# Patient Record
Sex: MALE | Race: BLACK OR AFRICAN AMERICAN
[De-identification: names, ages, dates, MRNs, and addresses within clinical notes are randomized per-mention and may not be internally consistent; named-entity substitution may affect disease eponyms.]

---

## 2018-01-18 ENCOUNTER — APPOINTMENT (OUTPATIENT)
Dept: GERIATRICS | Facility: CLINIC | Age: 73
End: 2018-01-18

## 2018-01-29 ENCOUNTER — APPOINTMENT (OUTPATIENT)
Dept: INTERNAL MEDICINE | Facility: CLINIC | Age: 73
End: 2018-01-29

## 2020-12-03 ENCOUNTER — OUTPATIENT (OUTPATIENT)
Dept: OUTPATIENT SERVICES | Facility: HOSPITAL | Age: 75
LOS: 1 days | Discharge: HOME | End: 2020-12-03

## 2020-12-03 ENCOUNTER — APPOINTMENT (OUTPATIENT)
Dept: GERIATRICS | Facility: CLINIC | Age: 75
End: 2020-12-03
Payer: MEDICAID

## 2020-12-03 VITALS
SYSTOLIC BLOOD PRESSURE: 129 MMHG | TEMPERATURE: 97.2 F | HEART RATE: 81 BPM | DIASTOLIC BLOOD PRESSURE: 81 MMHG | WEIGHT: 154 LBS | BODY MASS INDEX: 23.89 KG/M2 | HEIGHT: 67.5 IN

## 2020-12-03 DIAGNOSIS — Z86.59 PERSONAL HISTORY OF OTHER MENTAL AND BEHAVIORAL DISORDERS: ICD-10-CM

## 2020-12-03 DIAGNOSIS — Z71.6 TOBACCO ABUSE COUNSELING: ICD-10-CM

## 2020-12-03 DIAGNOSIS — F17.200 NICOTINE DEPENDENCE, UNSPECIFIED, UNCOMPLICATED: ICD-10-CM

## 2020-12-03 PROCEDURE — 99203 OFFICE O/P NEW LOW 30 MIN: CPT | Mod: GC

## 2020-12-07 PROBLEM — Z71.6 ENCOUNTER FOR TOBACCO USE CESSATION COUNSELING: Status: ACTIVE | Noted: 2020-12-07

## 2020-12-07 NOTE — PHYSICAL EXAM
[General Appearance - In No Acute Distress] : in no acute distress [Sclera] : the sclera and conjunctiva were normal [PERRL With Normal Accommodation] : pupils were equal in size, round, and reactive to light [Extraocular Movements] : extraocular movements were intact [Neck Appearance] : the appearance of the neck was normal [Neck Cervical Mass (___cm)] : no neck mass was observed [Jugular Venous Distention Increased] : there was no jugular-venous distention [Thyroid Diffuse Enlargement] : the thyroid was not enlarged [Thyroid Nodule] : there were no palpable thyroid nodules [Auscultation Breath Sounds / Voice Sounds] : lungs were clear to auscultation bilaterally [Heart Rate And Rhythm] : heart rate was normal and rhythm regular [Heart Sounds] : normal S1 and S2 [Heart Sounds Gallop] : no gallops [Murmurs] : no murmurs [Heart Sounds Pericardial Friction Rub] : no pericardial rub [Bowel Sounds] : normal bowel sounds [Abdomen Soft] : soft [Abdomen Tenderness] : non-tender [] : no hepato-splenomegaly [Abdomen Mass (___ Cm)] : no abdominal mass palpated [No Focal Deficits] : no focal deficits [FreeTextEntry1] : fred CCO3

## 2020-12-07 NOTE — HISTORY OF PRESENT ILLNESS
[FreeTextEntry1] : 75 yo male with schizophrenia, htn presented for a new visit for health maintenance\par The pt is accompanied by a nurse from [facility].\par He is well oriented and he described himself as " being taunted by demons since year 1965" but now he is no more having this problem. \par The patient was restless during the encounter but he was fully oriented and able to answer the questions. \par He has no chest pain , abdominal pain , or SOB . NO constipation , diarrhea or dysuria. He has a tremor since his childhood. Has no tardive dyskinesia or stiffness , but was restless during the encounter. His action might  not be contributed to akathisia but more of anxiety as he wanted to leave the clinic many times. He has been gaining wait recently.\par The patient has no suicidal thoughts or any thoughts to hurt anyone.\par

## 2020-12-07 NOTE — ASSESSMENT
[FreeTextEntry1] : 76 yo male presented for health maintenance visit\par \par #schizophrenia \par he is on Haldol, high doses, and olanzapine, compliant with medications  \par need psychiatry charts\par no side effects from the antipsychotic medications \par EKG done and will be requested from the [place], patient refuses to have one today\par \par #htn\par controlled on enalapril\par \par # dld\par on statins \par \par he was counseled about smoking cessation \par colonoscopy offered but patient refused \par will obtain EKG and blood work done from the [place] [facility]\par \par \par \par  [Medication Management] : medication management

## 2021-02-26 ENCOUNTER — EMERGENCY (EMERGENCY)
Facility: HOSPITAL | Age: 76
LOS: 0 days | Discharge: HOME | End: 2021-02-26
Admitting: EMERGENCY MEDICINE
Payer: COMMERCIAL

## 2021-02-26 VITALS
RESPIRATION RATE: 18 BRPM | SYSTOLIC BLOOD PRESSURE: 189 MMHG | OXYGEN SATURATION: 95 % | HEART RATE: 72 BPM | DIASTOLIC BLOOD PRESSURE: 115 MMHG | TEMPERATURE: 98 F

## 2021-02-26 DIAGNOSIS — R53.1 WEAKNESS: ICD-10-CM

## 2021-02-26 PROCEDURE — L9991: CPT

## 2021-02-26 NOTE — ED ADULT TRIAGE NOTE - CHIEF COMPLAINT QUOTE
Pt smoked a random cigarette off the floor and now is unable to stand. Pt states "maybe it was K2."
68

## 2021-04-01 ENCOUNTER — APPOINTMENT (OUTPATIENT)
Dept: GERIATRICS | Facility: CLINIC | Age: 76
End: 2021-04-01
Payer: MEDICAID

## 2021-04-01 ENCOUNTER — OUTPATIENT (OUTPATIENT)
Dept: OUTPATIENT SERVICES | Facility: HOSPITAL | Age: 76
LOS: 1 days | Discharge: HOME | End: 2021-04-01

## 2021-04-01 DIAGNOSIS — F20.9 SCHIZOPHRENIA, UNSPECIFIED: ICD-10-CM

## 2021-04-01 DIAGNOSIS — Z00.00 ENCOUNTER FOR GENERAL ADULT MEDICAL EXAMINATION W/OUT ABNORMAL FINDINGS: ICD-10-CM

## 2021-04-01 DIAGNOSIS — I10 ESSENTIAL (PRIMARY) HYPERTENSION: ICD-10-CM

## 2021-04-01 PROCEDURE — ZZZZZ: CPT

## 2021-04-01 RX ORDER — BUSPIRONE HYDROCHLORIDE 15 MG/1
15 TABLET ORAL TWICE DAILY
Qty: 60 | Refills: 5 | Status: ACTIVE | COMMUNITY
Start: 2020-12-03 | End: 1900-01-01

## 2021-04-01 RX ORDER — HALOPERIDOL 5 MG/1
5 TABLET ORAL
Qty: 90 | Refills: 5 | Status: ACTIVE | COMMUNITY
Start: 2020-12-03 | End: 1900-01-01

## 2021-04-01 RX ORDER — OLANZAPINE 20 MG/1
20 TABLET, FILM COATED ORAL
Qty: 30 | Refills: 5 | Status: ACTIVE | COMMUNITY
Start: 2020-12-03 | End: 1900-01-01

## 2021-04-01 RX ORDER — BENZTROPINE MESYLATE 2 MG/1
2 TABLET ORAL
Qty: 60 | Refills: 5 | Status: ACTIVE | COMMUNITY
Start: 2020-12-03 | End: 1900-01-01

## 2021-04-01 RX ORDER — LAMOTRIGINE 25 MG/1
25 TABLET ORAL DAILY
Qty: 60 | Refills: 5 | Status: ACTIVE | COMMUNITY
Start: 2020-12-03 | End: 1900-01-01

## 2021-04-01 RX ORDER — LATANOPROST/PF 0.005 %
0.01 DROPS OPHTHALMIC (EYE)
Qty: 3 | Refills: 1 | Status: ACTIVE | COMMUNITY
Start: 2020-12-03 | End: 1900-01-01

## 2021-04-01 RX ORDER — SIMVASTATIN 20 MG/1
20 TABLET, FILM COATED ORAL
Qty: 30 | Refills: 5 | Status: ACTIVE | COMMUNITY
Start: 2020-12-03 | End: 1900-01-01

## 2021-04-01 RX ORDER — HALOPERIDOL 10 MG/1
10 TABLET ORAL
Qty: 30 | Refills: 5 | Status: ACTIVE | COMMUNITY
Start: 2020-12-03 | End: 1900-01-01

## 2021-04-01 NOTE — HISTORY OF PRESENT ILLNESS
[Home] : at home, [unfilled] , at the time of the visit. [Medical Office: ([facility])___] : at the medical office located in  [Care Coordinator] : care coordinator [Verbal consent obtained from patient] : the patient, [unfilled] [FreeTextEntry1] : follow up visit [de-identified] : Spoke with the Nurse Marta from [facility] who gave the patient update.\par also spoke with the patient briefly.\par \par 74 yo male with PMH of schizophrenia, HTN, DLD, lives at [facility].\par he had telephonic appointment today.\par no complains\par compliant with the medication\par no medication side effects\par occasionally talks to himself\par \par \par \par

## 2021-04-01 NOTE — ASSESSMENT
[FreeTextEntry1] : 76 yo male had appointment for telephonic visit\par \par #schizophrenia \par he is on Haldol, high doses, and olanzapine, compliant with medications \par occasional hallucination, no negative symptoms\par need psychiatry charts\par no side effects from the antipsychotic medications \par psych meds refilled by the psychiatrist\par EKG script sent\par \par #htn\par controlled on enalapril\par \par # dld\par on statins \par \par HCM:\par colonoscopy request sent\par routine labs ordered\par f/u in 3 months\par \par

## 2021-04-01 NOTE — END OF VISIT
[] : Resident [FreeTextEntry3] : SEEN WITH CHARLY TEAM; was to be seen at clinic but couldn't get patient up and out today from psych unit; discussions held with patient and nurse on his unit; he is doing ell; meds were renewed and labs being ordered as well as screening colonoscopy.; RTC 3 months.

## 2021-04-02 DIAGNOSIS — F20.9 SCHIZOPHRENIA, UNSPECIFIED: ICD-10-CM

## 2021-04-02 DIAGNOSIS — I10 ESSENTIAL (PRIMARY) HYPERTENSION: ICD-10-CM

## 2021-04-02 DIAGNOSIS — Z00.00 ENCOUNTER FOR GENERAL ADULT MEDICAL EXAMINATION WITHOUT ABNORMAL FINDINGS: ICD-10-CM

## 2021-04-09 ENCOUNTER — NON-APPOINTMENT (OUTPATIENT)
Age: 76
End: 2021-04-09

## 2021-06-24 RX ORDER — CARBOXYMETHYLCELLULOSE SODIUM 5 MG/ML
0.5 SOLUTION/ DROPS OPHTHALMIC EVERY 4 HOURS
Qty: 2 | Refills: 3 | Status: ACTIVE | COMMUNITY
Start: 2020-12-03 | End: 1900-01-01

## 2021-06-24 RX ORDER — ENALAPRIL MALEATE 2.5 MG/1
2.5 TABLET ORAL
Qty: 90 | Refills: 3 | Status: ACTIVE | COMMUNITY
Start: 2020-12-03 | End: 1900-01-01

## 2021-09-23 ENCOUNTER — APPOINTMENT (OUTPATIENT)
Dept: GERIATRICS | Facility: CLINIC | Age: 76
End: 2021-09-23

## 2021-10-25 ENCOUNTER — INPATIENT (INPATIENT)
Facility: HOSPITAL | Age: 76
LOS: 2 days | Discharge: PSYCHIATRIC OR SUBSTANCE USE FACILITY | End: 2021-10-28
Attending: INTERNAL MEDICINE | Admitting: INTERNAL MEDICINE
Payer: MEDICAID

## 2021-10-25 VITALS
DIASTOLIC BLOOD PRESSURE: 77 MMHG | RESPIRATION RATE: 18 BRPM | TEMPERATURE: 98 F | OXYGEN SATURATION: 99 % | HEART RATE: 76 BPM | SYSTOLIC BLOOD PRESSURE: 140 MMHG

## 2021-10-25 DIAGNOSIS — Z53.20 PROCEDURE AND TREATMENT NOT CARRIED OUT BECAUSE OF PATIENT'S DECISION FOR UNSPECIFIED REASONS: ICD-10-CM

## 2021-10-25 DIAGNOSIS — R26.9 UNSPECIFIED ABNORMALITIES OF GAIT AND MOBILITY: ICD-10-CM

## 2021-10-25 DIAGNOSIS — F20.9 SCHIZOPHRENIA, UNSPECIFIED: ICD-10-CM

## 2021-10-25 DIAGNOSIS — R25.1 TREMOR, UNSPECIFIED: ICD-10-CM

## 2021-10-25 DIAGNOSIS — F17.210 NICOTINE DEPENDENCE, CIGARETTES, UNCOMPLICATED: ICD-10-CM

## 2021-10-25 DIAGNOSIS — R53.1 WEAKNESS: ICD-10-CM

## 2021-10-25 DIAGNOSIS — R29.898 OTHER SYMPTOMS AND SIGNS INVOLVING THE MUSCULOSKELETAL SYSTEM: ICD-10-CM

## 2021-10-25 DIAGNOSIS — N18.9 CHRONIC KIDNEY DISEASE, UNSPECIFIED: ICD-10-CM

## 2021-10-25 DIAGNOSIS — I10 ESSENTIAL (PRIMARY) HYPERTENSION: ICD-10-CM

## 2021-10-25 DIAGNOSIS — E78.5 HYPERLIPIDEMIA, UNSPECIFIED: ICD-10-CM

## 2021-10-25 DIAGNOSIS — I12.9 HYPERTENSIVE CHRONIC KIDNEY DISEASE WITH STAGE 1 THROUGH STAGE 4 CHRONIC KIDNEY DISEASE, OR UNSPECIFIED CHRONIC KIDNEY DISEASE: ICD-10-CM

## 2021-10-25 DIAGNOSIS — H40.9 UNSPECIFIED GLAUCOMA: ICD-10-CM

## 2021-10-25 DIAGNOSIS — Z91.19 PATIENT'S NONCOMPLIANCE WITH OTHER MEDICAL TREATMENT AND REGIMEN: ICD-10-CM

## 2021-10-25 LAB
ALBUMIN SERPL ELPH-MCNC: 4.1 G/DL — SIGNIFICANT CHANGE UP (ref 3.5–5.2)
ALP SERPL-CCNC: 87 U/L — SIGNIFICANT CHANGE UP (ref 30–115)
ALT FLD-CCNC: 18 U/L — SIGNIFICANT CHANGE UP (ref 0–41)
ANION GAP SERPL CALC-SCNC: 12 MMOL/L — SIGNIFICANT CHANGE UP (ref 7–14)
AST SERPL-CCNC: 16 U/L — SIGNIFICANT CHANGE UP (ref 0–41)
BASOPHILS # BLD AUTO: 0.01 K/UL — SIGNIFICANT CHANGE UP (ref 0–0.2)
BASOPHILS NFR BLD AUTO: 0.2 % — SIGNIFICANT CHANGE UP (ref 0–1)
BILIRUB SERPL-MCNC: 0.3 MG/DL — SIGNIFICANT CHANGE UP (ref 0.2–1.2)
BUN SERPL-MCNC: 18 MG/DL — SIGNIFICANT CHANGE UP (ref 10–20)
CALCIUM SERPL-MCNC: 9.2 MG/DL — SIGNIFICANT CHANGE UP (ref 8.5–10.1)
CHLORIDE SERPL-SCNC: 106 MMOL/L — SIGNIFICANT CHANGE UP (ref 98–110)
CO2 SERPL-SCNC: 22 MMOL/L — SIGNIFICANT CHANGE UP (ref 17–32)
CREAT SERPL-MCNC: 1.6 MG/DL — HIGH (ref 0.7–1.5)
EOSINOPHIL # BLD AUTO: 0.02 K/UL — SIGNIFICANT CHANGE UP (ref 0–0.7)
EOSINOPHIL NFR BLD AUTO: 0.4 % — SIGNIFICANT CHANGE UP (ref 0–8)
GLUCOSE SERPL-MCNC: 107 MG/DL — HIGH (ref 70–99)
HCT VFR BLD CALC: 38.3 % — LOW (ref 42–52)
HGB BLD-MCNC: 13.7 G/DL — LOW (ref 14–18)
IMM GRANULOCYTES NFR BLD AUTO: 0.2 % — SIGNIFICANT CHANGE UP (ref 0.1–0.3)
LYMPHOCYTES # BLD AUTO: 1.26 K/UL — SIGNIFICANT CHANGE UP (ref 1.2–3.4)
LYMPHOCYTES # BLD AUTO: 22.7 % — SIGNIFICANT CHANGE UP (ref 20.5–51.1)
MAGNESIUM SERPL-MCNC: 2 MG/DL — SIGNIFICANT CHANGE UP (ref 1.8–2.4)
MCHC RBC-ENTMCNC: 30.9 PG — SIGNIFICANT CHANGE UP (ref 27–31)
MCHC RBC-ENTMCNC: 35.8 G/DL — SIGNIFICANT CHANGE UP (ref 32–37)
MCV RBC AUTO: 86.3 FL — SIGNIFICANT CHANGE UP (ref 80–94)
MONOCYTES # BLD AUTO: 0.65 K/UL — HIGH (ref 0.1–0.6)
MONOCYTES NFR BLD AUTO: 11.7 % — HIGH (ref 1.7–9.3)
NEUTROPHILS # BLD AUTO: 3.6 K/UL — SIGNIFICANT CHANGE UP (ref 1.4–6.5)
NEUTROPHILS NFR BLD AUTO: 64.8 % — SIGNIFICANT CHANGE UP (ref 42.2–75.2)
NRBC # BLD: 0 /100 WBCS — SIGNIFICANT CHANGE UP (ref 0–0)
PLATELET # BLD AUTO: 97 K/UL — LOW (ref 130–400)
POTASSIUM SERPL-MCNC: 4.4 MMOL/L — SIGNIFICANT CHANGE UP (ref 3.5–5)
POTASSIUM SERPL-SCNC: 4.4 MMOL/L — SIGNIFICANT CHANGE UP (ref 3.5–5)
PROT SERPL-MCNC: 7 G/DL — SIGNIFICANT CHANGE UP (ref 6–8)
RBC # BLD: 4.44 M/UL — LOW (ref 4.7–6.1)
RBC # FLD: 13.3 % — SIGNIFICANT CHANGE UP (ref 11.5–14.5)
SARS-COV-2 RNA SPEC QL NAA+PROBE: SIGNIFICANT CHANGE UP
SODIUM SERPL-SCNC: 140 MMOL/L — SIGNIFICANT CHANGE UP (ref 135–146)
TROPONIN T SERPL-MCNC: <0.01 NG/ML — SIGNIFICANT CHANGE UP
WBC # BLD: 5.55 K/UL — SIGNIFICANT CHANGE UP (ref 4.8–10.8)
WBC # FLD AUTO: 5.55 K/UL — SIGNIFICANT CHANGE UP (ref 4.8–10.8)

## 2021-10-25 PROCEDURE — 99223 1ST HOSP IP/OBS HIGH 75: CPT

## 2021-10-25 PROCEDURE — 71045 X-RAY EXAM CHEST 1 VIEW: CPT | Mod: 26

## 2021-10-25 PROCEDURE — 70450 CT HEAD/BRAIN W/O DYE: CPT | Mod: 26,MA

## 2021-10-25 PROCEDURE — 93010 ELECTROCARDIOGRAM REPORT: CPT

## 2021-10-25 PROCEDURE — 99285 EMERGENCY DEPT VISIT HI MDM: CPT

## 2021-10-25 RX ORDER — CHLORHEXIDINE GLUCONATE 213 G/1000ML
1 SOLUTION TOPICAL
Refills: 0 | Status: DISCONTINUED | OUTPATIENT
Start: 2021-10-25 | End: 2021-10-28

## 2021-10-25 RX ORDER — SIMVASTATIN 20 MG/1
1 TABLET, FILM COATED ORAL
Qty: 0 | Refills: 0 | DISCHARGE

## 2021-10-25 RX ORDER — AMLODIPINE BESYLATE 2.5 MG/1
5 TABLET ORAL DAILY
Refills: 0 | Status: DISCONTINUED | OUTPATIENT
Start: 2021-10-26 | End: 2021-10-28

## 2021-10-25 RX ORDER — BENZTROPINE MESYLATE 1 MG
1 TABLET ORAL
Qty: 0 | Refills: 0 | DISCHARGE

## 2021-10-25 RX ORDER — BENZTROPINE MESYLATE 1 MG
2 TABLET ORAL
Refills: 0 | Status: DISCONTINUED | OUTPATIENT
Start: 2021-10-25 | End: 2021-10-28

## 2021-10-25 RX ORDER — LATANOPROST 0.05 MG/ML
1 SOLUTION/ DROPS OPHTHALMIC; TOPICAL
Qty: 0 | Refills: 0 | DISCHARGE

## 2021-10-25 RX ORDER — SIMVASTATIN 20 MG/1
20 TABLET, FILM COATED ORAL AT BEDTIME
Refills: 0 | Status: DISCONTINUED | OUTPATIENT
Start: 2021-10-25 | End: 2021-10-28

## 2021-10-25 RX ORDER — HALOPERIDOL DECANOATE 100 MG/ML
1 INJECTION INTRAMUSCULAR
Qty: 0 | Refills: 0 | DISCHARGE

## 2021-10-25 RX ORDER — LATANOPROST 0.05 MG/ML
1 SOLUTION/ DROPS OPHTHALMIC; TOPICAL AT BEDTIME
Refills: 0 | Status: DISCONTINUED | OUTPATIENT
Start: 2021-10-25 | End: 2021-10-28

## 2021-10-25 RX ORDER — ACETAMINOPHEN 500 MG
650 TABLET ORAL EVERY 6 HOURS
Refills: 0 | Status: DISCONTINUED | OUTPATIENT
Start: 2021-10-25 | End: 2021-10-28

## 2021-10-25 RX ORDER — HALOPERIDOL DECANOATE 100 MG/ML
5 INJECTION INTRAMUSCULAR
Refills: 0 | Status: DISCONTINUED | OUTPATIENT
Start: 2021-10-25 | End: 2021-10-28

## 2021-10-25 RX ORDER — ENOXAPARIN SODIUM 100 MG/ML
40 INJECTION SUBCUTANEOUS DAILY
Refills: 0 | Status: DISCONTINUED | OUTPATIENT
Start: 2021-10-25 | End: 2021-10-28

## 2021-10-25 RX ORDER — HALOPERIDOL DECANOATE 100 MG/ML
10 INJECTION INTRAMUSCULAR AT BEDTIME
Refills: 0 | Status: DISCONTINUED | OUTPATIENT
Start: 2021-10-25 | End: 2021-10-28

## 2021-10-25 NOTE — ED PROVIDER NOTE - OBJECTIVE STATEMENT
pt with pmhx HTN, HLD, schizophrenia from [facility] presents to ED c/o generalized weakness for weeks, no involving b/l LE and reports he almost fell today, no trauma. Denies fever/chill/HA/dizziness/chest pain/palpitation/sob/abd pain/n/v/d/ black stool/bloody stool/urinary sxs

## 2021-10-25 NOTE — ED PROVIDER NOTE - CLINICAL SUMMARY MEDICAL DECISION MAKING FREE TEXT BOX
76M with pmh schizophrenia on high dose haldol olanzapine, HTN, HLD who presents with weakness to b/l LE for several weeks. EKG, CXR, HCT and labs reviewed and wnl. On reassessment pt states he still feels weak; recommend neuro eval and will admit for further mgmt, PT.

## 2021-10-25 NOTE — H&P ADULT - DOES THIS PATIENT HAVE A HISTORY OF OR HAS BEEN DX WITH HEART FAILURE?
-------------------CRITICAL LAB VALUE-------------------    Lab Value: Hemoglobin 6.5  Time of notification: 5:43 AM  MD notified: CARDS [id number]  Patient status:  Temp:  [97  F (36.1  C)-99.3  F (37.4  C)] 98.3  F (36.8  C)  Pulse:  [] 73  Heart Rate:  [68-91] 71  Resp:  [18-30] 18  BP: ()/() 93/67  SpO2:  [95 %-100 %] 96 %  Orders received:   Recheck CBC with platelets    no

## 2021-10-25 NOTE — H&P ADULT - HISTORY OF PRESENT ILLNESS
77yo M with pmhx of HLD, HTN, schizophrenia  75yo M with pmhx of HLD, HTN, schizophrenia presents from [facility] for b/l leg weakness. Patient almost fell when attempting to go to the bathroom. Said his legs gave out on him and denies any dizziness/syncope. Patient is otherwise poor historian saying he does not have HLD/HTN. Patient also states he has a tremor for about 15 years for which no doctor has given him treatment for. Leg weakness is also accompanied by pain which he cannot point to a specific part of his legs.     ED course:   vitals: /77, HR 76, afebrile, O2 99% RA   labs: Cr 1.6  imaging:   - CT head: wnl

## 2021-10-25 NOTE — ED PROVIDER NOTE - NS ED ROS FT
Constitutional: no fever, chills, no recent weight loss, change in appetite or malaise  Eyes: no redness/discharge/pain/vision changes  ENT: no rhinorrhea/ear pain/sore throat  Cardiac: No chest pain, SOB or edema.  Respiratory: No cough or respiratory distress  GI: No nausea, vomiting, diarrhea or abdominal pain.  : No dysuria, frequency, urgency or hematuria  MS: no pain to back or extremities, no loss of ROM  Neuro: No headache. No LOC.  Skin: No skin rash.  Endocrine: No history of thyroid disease or diabetes.  Except as documented in the HPI, all other systems are negative.

## 2021-10-25 NOTE — ED PROVIDER NOTE - ATTENDING CONTRIBUTION TO CARE
76M with pmh schizophrenia on high dose haldol olanzapine, HTN, HLD who presents with weakness to b/l LE for several weeks. He denies ascending weakness, falls, focal weakness or numbness, headache, visual changes.     Gen - NAD, Head - NCAT, Pharynx - clear, MMM, Heart - RRR, no m/g/r, Lungs - CTAB, no w/c/r, Abdomen - soft, NT, ND, Skin - No rash, Extremities - 5/5 muscle strength b/l LE, FROM, no edema, erythema, ecchymosis, brisk cap refill, Neuro - A&O x3, equal strength and sensation, non-focal exam    a/p:  subjective weakness to b/l LE, 5/5 strengh on exam  EKG, CXR, labs incl trop, mag, HCT reassess

## 2021-10-25 NOTE — ED ADULT NURSE NOTE - OBJECTIVE STATEMENT
Pt presents to ED with c/o bilateral lower extremity weakness. Pt states he has had this issue for years, but its been exacerbated over the past few days. Pt denies trauma, N/V/D, dizziness, chest pain, SOB.

## 2021-10-25 NOTE — H&P ADULT - NSHPLABSRESULTS_GEN_ALL_CORE
13.7   5.55  )-----------( 97       ( 25 Oct 2021 17:10 )             38.3         140  |  106  |  18  ----------------------------<  107<H>  4.4   |  22  |  1.6<H>    Ca    9.2      25 Oct 2021 17:10  Mg     2.0     10-25    TPro  7.0  /  Alb  4.1  /  TBili  0.3  /  DBili  x   /  AST  16  /  ALT  18  /  AlkPhos  87  10-25        CXR: no acute cardiopul disease noted (read by me)

## 2021-10-25 NOTE — H&P ADULT - NSHPSOCIALHISTORY_GEN_ALL_CORE
Marital Status:  (   )    (   ) Single    (   )    (  )   Lives with: (  ) alone  (  ) children   (  ) spouse   (  ) parents  (  ) other  Recent Travel: No recent travel  Occupation:    Substance Use (street drugs): ( x ) never used  (  ) other:  Tobacco Usage:  ( x  ) never smoked   (   ) former smoker   (   ) current smoker  (     ) pack year  Alcohol Usage: None denies smoking tobacco, drinking alcohol, drug use

## 2021-10-25 NOTE — H&P ADULT - NSHPPHYSICALEXAM_GEN_ALL_CORE
T(C): 36.7 (10-25-21 @ 15:35), Max: 36.7 (10-25-21 @ 15:35)  HR: 76 (10-25-21 @ 15:35) (76 - 76)  BP: 140/77 (10-25-21 @ 15:35) (140/77 - 140/77)  RR: 18 (10-25-21 @ 15:35) (18 - 18)  SpO2: 99% (10-25-21 @ 15:35) (99% - 99%)        GENERAL: NAD, well-developed  HEAD:  Atraumatic, Normocephalic  EYES: EOMI, PERRLA, conjunctiva and sclera clear  ENT: Normal tympanic membrane. No nasal obstruction or discharge. No tonsillar exudate, swelling or erythema.  NECK: Supple, No JVD  CHEST/LUNG: Clear to auscultation bilaterally; No wheeze  HEART: Regular rate and rhythm; No murmurs, rubs, or gallops  ABDOMEN: Soft, Nontender, Nondistended; Bowel sounds present  EXTREMITIES:  2+ Peripheral Pulses, No clubbing, cyanosis, or edema  PSYCH: AAOx3  NEUROLOGY: non-focal  SKIN: No rashes or lesions

## 2021-10-25 NOTE — H&P ADULT - ATTENDING COMMENTS
76 yr old male with hx of HLD, HTN, schizophrenia presents from [facility] for b/l leg weakness. Patient reports for past few month he noticed weakness of both legs. Today patient was unable to ambulate due to severe weakness and legs giving out.  # B/L LE weakness   # Hx of Schizophrenia  - antipsych med side effect vs parkinson's disease vs muscular deconditioning  - check orthostatics   - check Vit B12, Folate, RPR   - psych consult for med adjustment   - neurology consult  - PT     # OLIVER vs. CKD  - Cr 1.6 (baseline unknown); BUN 18  - start NS@75  - f/u urine studies     # HTN   - hold enalapril  - start amlodipine 5mg daily    # DVT ppx  - start heparin s/q    # DASH diet    # Full code 76 yr old male with hx of HLD, HTN, schizophrenia presents from [facility] for b/l leg weakness. Patient reports for past few month he noticed weakness of both legs. Today patient was unable to ambulate due to severe weakness and legs giving out.  # B/L LE weakness   # Hx of Schizophrenia  - antipsych med side effect vs parkinson's disease vs muscular deconditioning  - check orthostatics   - check Vit B12, Folate, RPR   - psych consult for med adjustment   - neurology consult  - PT     # OLIVER vs. CKD  - Cr 1.6 (baseline unknown); BUN 18  - start NS@75  - f/u urine studies     # HTN   - hold enalapril  - start amlodipine 5mg daily    # DVT ppx    # DVT ppx  - start heparin s/q    # DASH diet    # Full code 76 yr old male with hx of HLD, HTN, schizophrenia presents from [facility] for b/l leg weakness. Patient reports for past few month he noticed weakness of both legs. Today patient was unable to ambulate due to severe weakness and legs giving out.    # B/L LE weakness   # Hx of Schizophrenia  - antipsych med side effect vs parkinson's disease vs muscular deconditioning  - check orthostatics   - check Vit B12, Folate, RPR   - psych consult for med adjustment   - neurology consult  - PT     # OLIVER vs. CKD  - Cr 1.6 (baseline unknown); BUN 18  - start NS@75  - f/u urine studies     # HTN   - hold enalapril  - start amlodipine 5mg daily    # DVT ppx    # DVT ppx  - start heparin s/q    # DASH diet    # Full code

## 2021-10-25 NOTE — H&P ADULT - ASSESSMENT
77yo M with pmhx of HLD, HTN, schizophrenia presents from [facility] for b/l leg weakness. Admitted for further work up.     #b/l leg weakness  - DDx: parkinsonism as side effect of psych meds (however patient's on benztropine)   - normal muscle strength on exam   - get vitamin B12/folate level; continue to trend lytes   - get PT, physiatry eval  - neuro consult; may need psych consult     #schizophrenia  - c/w haldol 5mg po am and 1pm, 10mg at hs   - c/w buspirinone 15mg bid and benztropine 2mg bid     #OLIVER vs. CKD  - Cr 1.6, eGFR 42; no prior records   - get urine lytes and renal ultrasound   - trend Cr daily     #HLD  - c/w simvastatin 20mg daily     #HTN   - takes enalapril 2.5mg daily; hold for now due to possible oliver   - start amlodipine 5mg daily (african american)     DVT ppx: lovenox   GI ppx: none  Diet: DASH  Code Status: full code  Dispo: from [place] [facility]; neuro consult, PT eval, urine lytes/renal u/s    77yo M with pmhx of HLD, HTN, schizophrenia presents from [facility] for b/l leg weakness. Admitted for further work up.     #b/l leg weakness  - DDx: parkinsonism as side effect of psych meds (however patient's on benztropine)   - normal muscle strength on exam   - get vitamin B12/folate level; get utox   - get PT, physiatry eval  - neuro consult; may need psych consult     #schizophrenia  - c/w haldol 5mg po am and 1pm, 10mg at hs   - c/w buspirinone 15mg bid and benztropine 2mg bid     #OLIVER vs. CKD  - Cr 1.6, eGFR 42; no prior records   - get urine lytes and renal ultrasound   - trend Cr daily     #HLD  - c/w simvastatin 20mg daily     #HTN   - takes enalapril 2.5mg daily; hold for now due to possible oliver   - start amlodipine 5mg daily (african american)     DVT ppx: lovenox   GI ppx: none  Diet: DASH  Code Status: full code  Dispo: from [place] [facility]; neuro consult, PT eval, urine lytes/renal u/s    77yo M with pmhx of HLD, HTN, schizophrenia presents from [facility] for b/l leg weakness. Admitted for further work up.     #b/l leg weakness  - DDx: parkinsonism as side effect of psych meds (however patient's on benztropine)   - normal muscle strength on exam   - get vitamin B12/folate level/RPR; get utox   - get PT, physiatry eval  - neuro consult; psych consult for possible adjustment of meds     #schizophrenia  - c/w haldol 5mg po am and 1pm, 10mg at hs   - c/w buspirinone 15mg bid and benztropine 2mg bid     #OLIVER vs. CKD  - Cr 1.6, eGFR 42; no prior records   - get urine lytes and renal ultrasound   - trend Cr daily     #HLD  - c/w simvastatin 20mg daily     #HTN   - takes enalapril 2.5mg daily; hold for now due to possible oliver   - start amlodipine 5mg daily (african american)     DVT ppx: lovenox   GI ppx: none  Diet: DASH  Code Status: full code  Dispo: from [place] [facility]; neuro consult, PT eval, urine lytes/renal u/s

## 2021-10-25 NOTE — ED PROVIDER NOTE - PHYSICAL EXAMINATION
CONSTITUTIONAL: Well-appearing; well-nourished; in no apparent distress.   CARDIOVASCULAR: Normal S1, S2; no murmurs, rubs, or gallops.   RESPIRATORY: Normal chest excursion with respiration; breath sounds clear and equal bilaterally; no wheezes, rhonchi, or rales.  MS: No evidence of trauma or deformity. Normal ROM in all four extremities; non-tender to palpation; distal pulses are normal.   SKIN: Normal for age and race; warm; dry; good turgor; no apparent lesions or exudate.   NEURO/PSYCH: A & O x 4; strength equal to b/l upper and lower extremities. speaking coherently.

## 2021-10-26 DIAGNOSIS — F20.9 SCHIZOPHRENIA, UNSPECIFIED: ICD-10-CM

## 2021-10-26 PROCEDURE — 99222 1ST HOSP IP/OBS MODERATE 55: CPT

## 2021-10-26 PROCEDURE — 99254 IP/OBS CNSLTJ NEW/EST MOD 60: CPT | Mod: GC

## 2021-10-26 PROCEDURE — 76775 US EXAM ABDO BACK WALL LIM: CPT | Mod: 26

## 2021-10-26 RX ORDER — SODIUM CHLORIDE 9 MG/ML
1000 INJECTION INTRAMUSCULAR; INTRAVENOUS; SUBCUTANEOUS
Refills: 0 | Status: DISCONTINUED | OUTPATIENT
Start: 2021-10-26 | End: 2021-10-28

## 2021-10-26 RX ADMIN — SODIUM CHLORIDE 75 MILLILITER(S): 9 INJECTION INTRAMUSCULAR; INTRAVENOUS; SUBCUTANEOUS at 22:21

## 2021-10-26 RX ADMIN — HALOPERIDOL DECANOATE 10 MILLIGRAM(S): 100 INJECTION INTRAMUSCULAR at 22:15

## 2021-10-26 RX ADMIN — Medication 2 MILLIGRAM(S): at 05:04

## 2021-10-26 RX ADMIN — ENOXAPARIN SODIUM 40 MILLIGRAM(S): 100 INJECTION SUBCUTANEOUS at 11:34

## 2021-10-26 RX ADMIN — Medication 2 MILLIGRAM(S): at 17:15

## 2021-10-26 RX ADMIN — Medication 15 MILLIGRAM(S): at 17:15

## 2021-10-26 RX ADMIN — HALOPERIDOL DECANOATE 5 MILLIGRAM(S): 100 INJECTION INTRAMUSCULAR at 05:05

## 2021-10-26 RX ADMIN — SIMVASTATIN 20 MILLIGRAM(S): 20 TABLET, FILM COATED ORAL at 22:15

## 2021-10-26 RX ADMIN — LATANOPROST 1 DROP(S): 0.05 SOLUTION/ DROPS OPHTHALMIC; TOPICAL at 22:16

## 2021-10-26 RX ADMIN — HALOPERIDOL DECANOATE 5 MILLIGRAM(S): 100 INJECTION INTRAMUSCULAR at 11:34

## 2021-10-26 RX ADMIN — Medication 15 MILLIGRAM(S): at 05:04

## 2021-10-26 RX ADMIN — AMLODIPINE BESYLATE 5 MILLIGRAM(S): 2.5 TABLET ORAL at 05:05

## 2021-10-26 RX ADMIN — CHLORHEXIDINE GLUCONATE 1 APPLICATION(S): 213 SOLUTION TOPICAL at 05:04

## 2021-10-26 NOTE — PHYSICAL THERAPY INITIAL EVALUATION ADULT - GENERAL OBSERVATIONS, REHAB EVAL
PT IE [id number]. Chart reviewed. pt encountered in bed. In NAD. + IV lock. pt appeared to be alert, confused and oriented x2-3

## 2021-10-26 NOTE — BEHAVIORAL HEALTH ASSESSMENT NOTE - NSBHCHARTREVIEWIMAGING_PSY_A_CORE FT
< from: CT Head No Cont (10.25.21 @ 17:10) >  Impression:    1. No evidence of acute intracranial hemorrhage or mass effect.

## 2021-10-26 NOTE — PROGRESS NOTE ADULT - SUBJECTIVE AND OBJECTIVE BOX
----------Daily Progress Note----------    HISTORY OF PRESENT ILLNESS:  Patient is a 76y old Male who presents with a chief complaint of leg weakness (26 Oct 2021 02:17)    Currently admitted to medicine with the primary diagnosis of Weakness    75yo M with pmhx of HLD, HTN, schizophrenia presents from [facility] for b/l leg weakness. Patient almost fell when attempting to go to the bathroom. Said his legs gave out on him and denies any dizziness/syncope. Patient is otherwise poor historian saying he does not have HLD/HTN. Patient also states he has a tremor for about 15 years for which no doctor has given him treatment for. Leg weakness is also accompanied by pain which he cannot point to a specific part of his legs.     ED course:   vitals: /77, HR 76, afebrile, O2 99% RA   labs: Cr 1.6  imaging:   - CT head: wnl        Today is hospital day 1d.     INTERVAL HOSPITAL COURSE / OVERNIGHT EVENTS:    Patient was examined and seen at bedside. This morning he is resting comfortably in bed and reports no new issues or overnight events.     Review of Systems: Otherwise unremarkable     <<<<<PAST MEDICAL & SURGICAL HISTORY>>>>>  Schizophrenia    Hypertension    Hyperlipidemia    Glaucoma    No significant past surgical history      ALLERGIES  No Known Allergies      Home Medications:  benztropine 2 mg oral tablet: 1 tab(s) orally 2 times a day (25 Oct 2021 23:48)  busPIRone 15 mg oral tablet: 1 tab(s) orally 2 times a day (25 Oct 2021 23:48)  enalapril 2.5 mg oral tablet: 1 tab(s) orally once a day (25 Oct 2021 23:48)  haloperidol 10 mg oral tablet: 1 tab(s) orally once a day (at bedtime) (25 Oct 2021 23:48)  haloperidol 5 mg oral tablet: 1 tab(s) orally in am and 1pm  (25 Oct 2021 23:48)  latanoprost 0.005% ophthalmic solution: 1 drop(s) to each affected eye once a day (in the evening) (25 Oct 2021 23:48)  simvastatin 20 mg oral tablet: 1 tab(s) orally once a day (at bedtime) (25 Oct 2021 23:48)        MEDICATIONS  STANDING MEDICATIONS  amLODIPine   Tablet 5 milliGRAM(s) Oral daily  benztropine 2 milliGRAM(s) Oral two times a day  busPIRone 15 milliGRAM(s) Oral two times a day  chlorhexidine 4% Liquid 1 Application(s) Topical <User Schedule>  enoxaparin Injectable 40 milliGRAM(s) SubCutaneous daily  haloperidol     Tablet 5 milliGRAM(s) Oral <User Schedule>  haloperidol     Tablet 10 milliGRAM(s) Oral at bedtime  latanoprost 0.005% Ophthalmic Solution 1 Drop(s) Both EYES at bedtime  simvastatin 20 milliGRAM(s) Oral at bedtime    PRN MEDICATIONS  acetaminophen     Tablet .. 650 milliGRAM(s) Oral every 6 hours PRN    VITALS:  T(F): 99.2  HR: 72  BP: 131/74  RR: 18  SpO2: 96%    <<<<<LABS>>>>>                        13.7   5.55  )-----------( 97       ( 25 Oct 2021 17:10 )             38.3     10-25    140  |  106  |  18  ----------------------------<  107<H>  4.4   |  22  |  1.6<H>    Ca    9.2      25 Oct 2021 17:10  Mg     2.0     10-25    TPro  7.0  /  Alb  4.1  /  TBili  0.3  /  DBili  x   /  AST  16  /  ALT  18  /  AlkPhos  87  10-25          Troponin T, Serum: <0.01 ng/mL (10-25-21 @ 17:10)    [id number]  CARDIAC MARKERS ( 25 Oct 2021 17:10 )  x     / <0.01 ng/mL / x     / x     / x            <<<<<RADIOLOGY>>>>>  < from: CT Head No Cont (10.25.21 @ 17:10) >    Impression:    1. No evidence of acute intracranial hemorrhage or mass effect.    --- End of Report ---    < end of copied text >      <<<<<PHYSICAL EXAM>>>>>  GENERAL: Well developed, well nourished and in no acute distress. Resting comfortably in bed.  HEENT: Normocephalic, atraumatic, mucous membranes moist, EOMI, PERRLA, bilateral sclera anicteric, no conjunctival injection  Neck: Supple, non-tender, no lymphadenopathy.  PULMONARY: Clear to auscultation bilaterally. No rales, rhonchi, or wheezing.  CARDIOVASCULAR: Regular rate and rhythm, S1-S2, no murmurs  GASTROINTESTINAL: Soft, non-tender, non-distended, no guarding.  RENAL: No CVA tenderness.  SKIN/EXTREMITIES: No clubbing or edema  NEUROLOGIC/MUSCULOSKELETAL: AOx4, grossly moving all extremities, no focal deficits.      ----------------------------------------------------------------------------------------------------------------------------------------------------------------------------------------------- ----------Daily Progress Note----------    HISTORY OF PRESENT ILLNESS:  Patient is a 76y old Male who presents with a chief complaint of leg weakness (26 Oct 2021 02:17)    Currently admitted to medicine with the primary diagnosis of Weakness    77yo M with pmhx of HLD, HTN, schizophrenia presents from [facility] for b/l leg weakness. Patient almost fell when attempting to go to the bathroom. Said his legs gave out on him and denies any dizziness/syncope. Patient is otherwise poor historian saying he does not have HLD/HTN. Patient also states he has a tremor for about 15 years for which no doctor has given him treatment for. Leg weakness is also accompanied by pain which he cannot point to a specific part of his legs.     ED course:   vitals: /77, HR 76, afebrile, O2 99% RA   labs: Cr 1.6  imaging:   - CT head: wnl        Today is hospital day 1d.     INTERVAL HOSPITAL COURSE / OVERNIGHT EVENTS:    Patient was examined and seen at bedside. This morning he is resting comfortably in bed and reports no new issues or overnight events.     Review of Systems: Patient is confused. Denies any headache, dizziness, fever, chills. Patient denies chest pain, palpitation, SOB. Patient denies weakness     <<<<<PAST MEDICAL & SURGICAL HISTORY>>>>>  Schizophrenia    Hypertension    Hyperlipidemia    Glaucoma    No significant past surgical history      ALLERGIES  No Known Allergies      Home Medications:  benztropine 2 mg oral tablet: 1 tab(s) orally 2 times a day (25 Oct 2021 23:48)  busPIRone 15 mg oral tablet: 1 tab(s) orally 2 times a day (25 Oct 2021 23:48)  enalapril 2.5 mg oral tablet: 1 tab(s) orally once a day (25 Oct 2021 23:48)  haloperidol 10 mg oral tablet: 1 tab(s) orally once a day (at bedtime) (25 Oct 2021 23:48)  haloperidol 5 mg oral tablet: 1 tab(s) orally in am and 1pm  (25 Oct 2021 23:48)  latanoprost 0.005% ophthalmic solution: 1 drop(s) to each affected eye once a day (in the evening) (25 Oct 2021 23:48)  simvastatin 20 mg oral tablet: 1 tab(s) orally once a day (at bedtime) (25 Oct 2021 23:48)        MEDICATIONS  STANDING MEDICATIONS  amLODIPine   Tablet 5 milliGRAM(s) Oral daily  benztropine 2 milliGRAM(s) Oral two times a day  busPIRone 15 milliGRAM(s) Oral two times a day  chlorhexidine 4% Liquid 1 Application(s) Topical <User Schedule>  enoxaparin Injectable 40 milliGRAM(s) SubCutaneous daily  haloperidol     Tablet 5 milliGRAM(s) Oral <User Schedule>  haloperidol     Tablet 10 milliGRAM(s) Oral at bedtime  latanoprost 0.005% Ophthalmic Solution 1 Drop(s) Both EYES at bedtime  simvastatin 20 milliGRAM(s) Oral at bedtime    PRN MEDICATIONS  acetaminophen     Tablet .. 650 milliGRAM(s) Oral every 6 hours PRN    VITALS:  T(F): 99.2  HR: 72  BP: 131/74  RR: 18  SpO2: 96%    <<<<<LABS>>>>>                        13.7   5.55  )-----------( 97       ( 25 Oct 2021 17:10 )             38.3     10-25    140  |  106  |  18  ----------------------------<  107<H>  4.4   |  22  |  1.6<H>    Ca    9.2      25 Oct 2021 17:10  Mg     2.0     10-25    TPro  7.0  /  Alb  4.1  /  TBili  0.3  /  DBili  x   /  AST  16  /  ALT  18  /  AlkPhos  87  10-25          Troponin T, Serum: <0.01 ng/mL (10-25-21 @ 17:10)    [id number]  CARDIAC MARKERS ( 25 Oct 2021 17:10 )  x     / <0.01 ng/mL / x     / x     / x            <<<<<RADIOLOGY>>>>>  < from: CT Head No Cont (10.25.21 @ 17:10) >    Impression:    1. No evidence of acute intracranial hemorrhage or mass effect.    --- End of Report ---    < end of copied text >      <<<<<PHYSICAL EXAM>>>>>  GENERAL: Well developed, well nourished and in no acute distress. Resting comfortably in bed.  PULMONARY: Clear to auscultation bilaterally. No rales, rhonchi, or wheezing.  CARDIOVASCULAR: Regular rate and rhythm, S1-S2, no murmurs  GASTROINTESTINAL: Soft, non-tender, non-distended, no guarding.  SKIN/EXTREMITIES: No clubbing or edema  NEUROLOGIC/MUSCULOSKELETAL: AOx1      -----------------------------------------------------------------------------------------------------------------------------------------------------------------------------------------------

## 2021-10-26 NOTE — CONSULT NOTE ADULT - ASSESSMENT
75yo M with pmhx of HLD, HTN, schizophrenia presents from [facility] for sudden b/l leg weakness which according to patient has been occuring intermittently since past few years.. Patient almost fell when attempting to go to the bathroom. Said his legs gave out on him and denies any dizziness/syncope. Patient also states he has a tremor for about 15 years for which no doctor has given him treatment. Leg weakness is not a/w back pain, numbness, tingling, b/b incontinence, or focal weakness. Patient has normal UE/LE strength , has brisk patellar reflex , normal sensation. CTH negative for acute intracranial findings.      Plan  MRI brain w/o contrast  Check vitamin b12, folate level, vitamin d levels , RPR  Neuro attending note will follow  
IMPRESSION: Rehab of gait dysfunction     PRECAUTIONS: [   ] Cardiac  [   ] Respiratory  [   ] Seizures [   ] Contact Isolation  [   ] Droplet Isolation  [   ] Other    Weight Bearing Status:     RECOMMENDATION:    Out of Bed to Chair     DVT/Decubiti Prophylaxis    REHAB PLAN:     [ x   ] Bedside P/T 3-5 times a week   [    ]   Bedside O/T  2-3 times a week             [    ] Speech Therapy               [    ]  No Rehab Therapy Indicated   Conditioning/ROM                                    ADL  Bed Mobility                                               Conditioning/ROM  Transfers                                                     Bed Mobility  Sitting /Standing Balance                         Transfers                                        Gait Training                                               Sitting/Standing Balance  Stair Training [   ]Applicable                    Home equipment Eval                                                                        Splinting  [   ] Only      GOALS:   ADL   [    ]   Independent                    Transfers  [ x   ] Independent                          Ambulation  [   x ] Independent     [   x  ] With device                            [    ]  CG                                                         [    ]  CG                                                                  [    ] CG                            [    ] Min A                                                   [    ] Min A                                                              [    ] Min  A          DISCHARGE PLAN:   [    ]  Good candidate for Intensive Rehabilitation/Hospital based                                             Will tolerate 3hrs Intensive Rehab Daily                                       [  x   ]  Short Term Rehab in Skilled Nursing Facility  /  psych unit                                       [     ]  Home with Outpatient or VN services                                         [     ]  Possible Candidate for Intensive Hospital based Rehab

## 2021-10-26 NOTE — BEHAVIORAL HEALTH ASSESSMENT NOTE - NSBHCONSULTFOLLOWAFTERCARE_PSY_A_CORE FT
Mr. Micehlle will return to [facility] to follow up with psychiatric care with Dr. Esparza Mr. Michelle will return to [facility] [place] [address], phone number - [phone] , with the plan to follow up with psychiatric care with his  Outpatient Psychiatrist Dr Esparza at the [facility] , [facility] , [address] [place] [address], phone number - [phone].

## 2021-10-26 NOTE — PROGRESS NOTE ADULT - ASSESSMENT
76 yr old male with hx of HLD, HTN, schizophrenia presents from [facility] for b/l leg weakness. Patient reports for past few month he noticed weakness of both legs. Today patient was unable to ambulate due to severe weakness and legs giving out.    #B/L LE weakness   #Hx of Schizophrenia  - antipsychotic med side effect vs parkinson's disease vs muscular deconditioning  - CT H: WNL   - F/u orthostatics   - f/u Vit B12, Folate, RPR   - F/u psych consult for med adjustment   - F/u neurology consult  - PT     #OLIVER vs. CKD  - Cr 1.6 (baseline unknown); BUN 18  - c/w NS@75  - f/u urine studies     #HTN   - hold Enalapril  - amlodipine 5mg QD was started instead     #HLD  - c/w Statin     #Misc   - DVT prophylaxis: Heparin sq  - GI prophylaxis not indicated   - Diet: DASH/TLC   - Activity status: AAT   - Code status: Full code   - Disposition: IPP once medically optimized   Pending:      76 yr old male with hx of HLD, HTN, schizophrenia presents from [facility] for b/l leg weakness. Patient reports for past few month he noticed weakness of both legs. Today patient was unable to ambulate due to severe weakness and legs giving out.    #B/L LE weakness   #Hx of Schizophrenia  - antipsychotic med side effect vs parkinson's disease vs muscular deconditioning  - CT H: WNL   - F/u orthostatics   - f/u Vit B12, Folate, RPR   - F/u psych consult for med adjustment   - F/u neurology consult  - PT   - Patient is refusing all labs "hurts his legs". spoke to the patient with psych resident and patient states if we draw labs his "legs will die"     #OLIVER vs. CKD  - Cr 1.6 (baseline unknown); BUN 18  - c/w NS@75  - f/u urine studies     #HTN   - hold Enalapril  - amlodipine 5mg QD was started instead     #HLD  - c/w Statin     #Misc   - DVT prophylaxis: Heparin sq  - GI prophylaxis not indicated   - Diet: DASH/TLC   - Activity status: AAT   - Code status: Full code   - Disposition: IPP once medically optimized   Pending:      76 yr old male with hx of HLD, HTN, schizophrenia presents from [facility] for b/l leg weakness. Patient reports for past few month he noticed weakness of both legs. Today patient was unable to ambulate due to severe weakness and legs giving out.    #B/L LE weakness   #Hx of Schizophrenia  - antipsychotic med side effect vs parkinson's disease vs muscular deconditioning  - CT H: WNL   - F/u orthostatics   - f/u Vit B12, Folate, RPR   - F/u psych consult for med adjustment   - F/u neurology consult  - PT   - Patient is refusing all labs "hurts his legs". spoke to the patient with psych resident and patient states if we draw labs his "legs will die"   - f/u MR Brain     #OLIVER vs. CKD  - Cr 1.6 (baseline unknown); BUN 18  - c/w NS@75  - f/u urine studies     #HTN   - hold Enalapril  - amlodipine 5mg QD was started instead     #HLD  - c/w Statin     #Misc   - DVT prophylaxis: Heparin sq  - GI prophylaxis not indicated   - Diet: DASH/TLC   - Activity status: AAT   - Code status: Full code   - Disposition: IPP once medically optimized   Pending:

## 2021-10-26 NOTE — BEHAVIORAL HEALTH ASSESSMENT NOTE - SUMMARY
Psychiatry recommends:    #Schizophrenia vs delirium   - patient does not require inpatient psychiatric admission for stabilization  - Please continue to explore medical causes for possible medical cause for symptoms of weakness  - haldol 5mg PO morning and 1pm, 10 mg PO nightly  - buspirone 15mg bid   - benztropine 2 mg PO BID Eber Michelle is a 76-year-old male, domiciled at [facility] for 13+ years; history of HTN, HLD, psychiatric history of paranoid schizophrenia, admitted due to bilateral leg weakness. Psychiatry consulted to assess if the patient would benefit from an adjustment in meds.    On evaluation, Mr. Michelle appears paranoid, but not frankly psychotic, with some delusions noted regarding the etiology of his leg weakness. This paranoia appears consistent with his chronic illness for which he resides at a transitional residence. He reports doing well with his currently medication regimen, and does not appear internally preoccupied. Additionally, he denies acute symptoms of depression or kevan, nor does he endorse suicidal intent, ideation, or plan. At this time, he does not appear to be a threat to himself or others; his psychiatric risk factors would not be mitigated by inpatient psychiatric admission.     Given his psychiatric symptoms are stable on his current medication regimen, we do not recommend modifying them during this admission, as the patient's weakness appears acute, and weakness is not typically associated with antipsychotic use (and he is not complaining of leg tremor). However, we recommend neurology consult for an assessment of patient’s bilateral leg weakness. Upon discharge, we recommend that patient follow up with his Outpatient Psychiatrist Dr Esparza at the [facility], [address] [place] [address], phone number - [phone].     Psychiatry recommends:    #Schizophrenia vs delirium   - patient does not require inpatient psychiatric admission for stabilization  - Please continue to explore medical causes for possible medical cause for symptoms of weakness  - haldol 5mg PO morning and 1pm, 10 mg PO nightly  - buspirone 15mg bid   - benztropine 2 mg PO BID

## 2021-10-26 NOTE — BEHAVIORAL HEALTH ASSESSMENT NOTE - CASE SUMMARY
Mr Michelle is a 76-year-old man with a history of Paranoid Schizophrenia who was admitted to the medical for the evaluation and management of  bilateral leg weakness. Psychiatry consult was called for medication management because of the concern by the medical team that patient has bilateral leg weakness and Parkinsonism like symptoms possibly as a result of his psychotropic medications.   It does not appear that patient’s reported leg weakness is as a result of Parkinsonism nor does it appear to be secondary to his psychotropic medication as the reported started a few days ago as reported by both the patient and the staff of his residence. Patient seems to have chronically been on antipsychotics and currently is said to be doing well on his current medications.   For now, patient appears to be paranoid however his paranoid appears to be chronic and he does not have any other symptoms suggestive of acute psychosis. Patient also does not have any symptoms suggestive of acute kevan or depression and he also denies having current suicidal ideations, intent or plan.  At this time, patient is not considered an imminent danger to himself or others and does not need inpatient psychiatric hospitalization. Patient will benefit from continuation of his psychotropic medications as prescribed by his outpatient psychiatrist. However, we recommend neurology consult for an assessment of patient’s bilateral leg weakness. Upon discharge, we recommend that patient follow up with his Outpatient Psychiatrist Dr Esparza at the [facility] , [address] [place] [address], phone number - [phone].

## 2021-10-26 NOTE — BEHAVIORAL HEALTH ASSESSMENT NOTE - NSBHCHARTREVIEWINVESTIGATE_PSY_A_CORE FT
< from: 12 Lead ECG (10.25.21 @ 16:19) >  Ventricular Rate 84 BPM  Atrial Rate 84 BPM  P-R Interval 164 ms  QRS Duration 138 ms  Q-T Interval 380 ms  QTC Calculation(Bazett) 449 ms

## 2021-10-26 NOTE — CONSULT NOTE ADULT - ATTENDING COMMENTS
Patient seen and examined and agree with above except as noted.  Patients history, notes, labs, imaging, vitals and meds reviewed personally.    Patient states he has had difficulty ambulating for about 2 years and has had multiple falls.  He has also had tremors since he was 17 years old.  His exam shows full strength in the LE and tremors in the R>L UE and some dyskinesias in the tongue and mouth.  No dysmetria and significant neuropathy    Plan as above (If workup negative then likely related to psych medications and parkinsonism)

## 2021-10-26 NOTE — BEHAVIORAL HEALTH ASSESSMENT NOTE - HPI (INCLUDE ILLNESS QUALITY, SEVERITY, DURATION, TIMING, CONTEXT, MODIFYING FACTORS, ASSOCIATED SIGNS AND SYMPTOMS)
Eber Michelle is a 76-year-old male, domiciled at [facility] for 13+ years; history of HTN, HLD, psychiatric history of paranoid schizophrenia, admitted due to bilateral leg weakness. Psychiatry consulted to assess if the patient would benefit from an adjustment in meds Eber Michelle is a 76-year-old male, domiciled at [facility] for 13+ years; history of HTN, HLD, psychiatric history of paranoid schizophrenia, admitted due to bilateral leg weakness. Psychiatry consulted to assess if the patient would benefit from an adjustment in meds.    On approach, Mr. Michelle is noted to have tremor at rest in his hands which disappears with purposeful movement, lying in bed in no acute distress. He reports that his weakness started approximately 2 days ago after having a blood draw. He describes the weakness is being from his waist down in both legs, not associated with pain, numbness, or tingling. He reports that he had a previous experience like this 2 years ago, but it self-resolved. He finds the symptoms highly upsetting, but does not want blood draws as he believes the blood draw at the doctor caused the illness, and he does not want anyone to "kill his legs." He reports compliance with his medications though he is unable to name them, is alert and oriented times 3, and states that he has not felt depressed, anxious, or paranoid that people are out to get him since he has been in the hospital. He denies hearing or seeing anything unusual, and reports that his symptoms of schizophrenia are well controlled with his current medications, although he is not sure what he's currently taking. He further suicidal ideation, plan, or intent, or homicidal ideation.     During the evaluation, leg strength was tested, and the patient with shown to be able to resist minimal downward movement and extension and flexion at the lake below the knee, and is able to move bilateral lower extremities purposely.    Per Marta Mr. Castro's nurse at [facility], Mr. Michelle has lived at the facility for greater than 12 years. She reports he is very a compliant with his medications, but refuses to see the medical doctor for primary Health care. She reports yesterday he went to see Dr. Alcantara for the first time in many years, and on his return yesterday afternoon reported of weakness at that point. He is not the type to complain about his physical symptoms, and is pretty quote low-key on quote she states that the other extremity josefina has been present for many years, and the patient receives Benztropine for it.    Attempted to reach Dr. Esparza, went to [facility].

## 2021-10-26 NOTE — BEHAVIORAL HEALTH ASSESSMENT NOTE - NSBHCHARTREVIEWLAB_PSY_A_CORE FT
13.7   5.55  )-----------( 97       ( 25 Oct 2021 17:10 )             38.3   10-25    140  |  106  |  18  ----------------------------<  107<H>  4.4   |  22  |  1.6<H>    Ca    9.2      25 Oct 2021 17:10  Mg     2.0     10-25    TPro  7.0  /  Alb  4.1  /  TBili  0.3  /  DBili  x   /  AST  16  /  ALT  18  /  AlkPhos  87  10-25

## 2021-10-26 NOTE — CONSULT NOTE ADULT - SUBJECTIVE AND OBJECTIVE BOX
HPI:  77yo M with pmhx of HLD, HTN, schizophrenia presents from [facility] for b/l leg weakness. Patient almost fell when attempting to go to the bathroom. Said his legs gave out on him and denies any dizziness/syncope. Patient is otherwise poor historian saying he does not have HLD/HTN. Patient also states he has a tremor for about 15 years for which no doctor has given him treatment for. Leg weakness is also accompanied by pain which he cannot point to a specific part of his legs.     ED course:   vitals: /77, HR 76, afebrile, O2 99% RA   labs: Cr 1.6  imaging:   - CT head: wnl  (25 Oct 2021 20:11)      PAST MEDICAL & SURGICAL HISTORY:  Schizophrenia    Hypertension    Hyperlipidemia    Glaucoma    No significant past surgical history        Hospital Course:    TODAY'S SUBJECTIVE & REVIEW OF SYMPTOMS:     Constitutional WNL   Cardio WNL   Resp WNL   GI WNL  Heme WNL  Endo WNL  Skin WNL  MSK Weakness  Neuro WNL  Cognitive WNL  Psych WNL      MEDICATIONS  (STANDING):  amLODIPine   Tablet 5 milliGRAM(s) Oral daily  benztropine 2 milliGRAM(s) Oral two times a day  busPIRone 15 milliGRAM(s) Oral two times a day  chlorhexidine 4% Liquid 1 Application(s) Topical <User Schedule>  enoxaparin Injectable 40 milliGRAM(s) SubCutaneous daily  haloperidol     Tablet 5 milliGRAM(s) Oral <User Schedule>  haloperidol     Tablet 10 milliGRAM(s) Oral at bedtime  latanoprost 0.005% Ophthalmic Solution 1 Drop(s) Both EYES at bedtime  simvastatin 20 milliGRAM(s) Oral at bedtime  sodium chloride 0.9%. 1000 milliLiter(s) (75 mL/Hr) IV Continuous <Continuous>    MEDICATIONS  (PRN):  acetaminophen     Tablet .. 650 milliGRAM(s) Oral every 6 hours PRN Temp greater or equal to 38C (100.4F), Mild Pain (1 - 3)      FAMILY HISTORY:  No pertinent family history in first degree relatives        Allergies    No Known Allergies    Intolerances        SOCIAL HISTORY:    [  ] Etoh  [  ] Smoking  [  ] Substance abuse     Home Environment:  [   ] Home Alone  [   ] Lives with Family  [   ] Home Health Aid    Dwelling:  [   ] Apartment  [   ] Private House  [   ] Adult Home  [x   ] Skilled Nursing Facility      [   ] Short Term  [   ] Long Term  [   ] Stairs       Elevator [   ]    FUNCTIONAL STATUS PTA: (Check all that apply)  Ambulation: [  x  ]Independent    [   ] Dependent     [   ] Non-Ambulatory  Assistive Device: [   ] SA Cane  [   ]  Q Cane  [   ] Walker  [   ]  Wheelchair  ADL : [  x ] Independent  [    ]  Dependent       Vital Signs Last 24 Hrs  T(C): 37.3 (26 Oct 2021 04:56), Max: 37.3 (26 Oct 2021 00:00)  T(F): 99.2 (26 Oct 2021 04:56), Max: 99.2 (26 Oct 2021 04:56)  HR: 72 (26 Oct 2021 04:56) (72 - 82)  BP: 131/74 (26 Oct 2021 04:56) (131/74 - 153/76)  BP(mean): 96 (26 Oct 2021 04:56) (96 - 96)  RR: 18 (26 Oct 2021 04:56) (18 - 20)  SpO2: 96% (26 Oct 2021 04:56) (96% - 99%)      PHYSICAL EXAM: Awake & Alert  GENERAL: NAD  HEAD:  Normocephalic  CHEST/LUNG: Clear   HEART: S1S2+  ABDOMEN: Soft, Nontender  EXTREMITIES:  no calf tenderness    NERVOUS SYSTEM:  Cranial Nerves 2-12 intact [   ] Abnormal  [   ]  ROM: WFL all extremities [   ]  Abnormal [   ]able to move all ext - limited cooperation  Motor Strength: WFL all extremities  [   ]  Abnormal [   ]  Sensation: intact to light touch [   ] Abnormal [   ]    FUNCTIONAL STATUS:  Bed Mobility: Independent [   ]  Supervision [ x  ]  Needs Assistance [   ]  N/A [   ]  Transfers: Independent [   ]  Supervision [   ]  Needs Assistance [x ]  N/A [   ]   Ambulation: Independent [   ]  Supervision [   ]  Needs Assistance [   ]  N/A [   ]  ADL: Independent [   ] Requires Assistance [   ] N/A [   ]      LABS:                        13.7   5.55  )-----------( 97       ( 25 Oct 2021 17:10 )             38.3     10-25    140  |  106  |  18  ----------------------------<  107<H>  4.4   |  22  |  1.6<H>    Ca    9.2      25 Oct 2021 17:10  Mg     2.0     10-25    TPro  7.0  /  Alb  4.1  /  TBili  0.3  /  DBili  x   /  AST  16  /  ALT  18  /  AlkPhos  87  10-25          RADIOLOGY & ADDITIONAL STUDIES:

## 2021-10-26 NOTE — PHYSICAL THERAPY INITIAL EVALUATION ADULT - PERTINENT HX OF CURRENT PROBLEM, REHAB EVAL
77yo M with pmhx of HLD, HTN, schizophrenia presents from [facility] for b/l leg weakness. Patient almost fell when attempting to go to the bathroom. Said his legs gave out on him and denies any dizziness/syncope

## 2021-10-26 NOTE — BEHAVIORAL HEALTH ASSESSMENT NOTE - NSBHCHARTREVIEWVS_PSY_A_CORE FT
Vital Signs Last 24 Hrs  T(C): 37.3 (26 Oct 2021 14:31), Max: 37.3 (26 Oct 2021 00:00)  T(F): 99.1 (26 Oct 2021 14:31), Max: 99.2 (26 Oct 2021 04:56)  HR: 95 (26 Oct 2021 14:31) (72 - 95)  BP: 110/66 (26 Oct 2021 14:31) (110/66 - 153/76)  BP(mean): 96 (26 Oct 2021 04:56) (96 - 96)  RR: 18 (26 Oct 2021 14:31) (18 - 20)  SpO2: 96% (26 Oct 2021 04:56) (96% - 99%)

## 2021-10-26 NOTE — BEHAVIORAL HEALTH ASSESSMENT NOTE - NSBHSOCIALHXDETAILSFT_PSY_A_CORE
Grew up in [place]; did not graduate high school. Reports having his first mental breakdown in 1965 at age 20.

## 2021-10-26 NOTE — PHYSICAL THERAPY INITIAL EVALUATION ADULT - LEVEL OF INDEPENDENCE: GAIT, REHAB EVAL
Pt refused ambulation despite max encouragement stating "my legs are too weak and I will fall down if I walk". pt refused to use RW for ambulation despite encouragement and educating on use of RW

## 2021-10-26 NOTE — BEHAVIORAL HEALTH ASSESSMENT NOTE - RISK ASSESSMENT
Low Acute Suicide Risk Risk factors include age, gender, impulsivity, poor insight  Protective factors include no hx of suicide intent, ideation, or plan, compliance with treatment

## 2021-10-27 PROCEDURE — 99233 SBSQ HOSP IP/OBS HIGH 50: CPT

## 2021-10-27 RX ADMIN — Medication 15 MILLIGRAM(S): at 06:10

## 2021-10-27 RX ADMIN — Medication 2 MILLIGRAM(S): at 06:10

## 2021-10-27 RX ADMIN — HALOPERIDOL DECANOATE 5 MILLIGRAM(S): 100 INJECTION INTRAMUSCULAR at 06:10

## 2021-10-27 RX ADMIN — Medication 15 MILLIGRAM(S): at 17:28

## 2021-10-27 RX ADMIN — SODIUM CHLORIDE 75 MILLILITER(S): 9 INJECTION INTRAMUSCULAR; INTRAVENOUS; SUBCUTANEOUS at 06:11

## 2021-10-27 RX ADMIN — ENOXAPARIN SODIUM 40 MILLIGRAM(S): 100 INJECTION SUBCUTANEOUS at 13:32

## 2021-10-27 RX ADMIN — CHLORHEXIDINE GLUCONATE 1 APPLICATION(S): 213 SOLUTION TOPICAL at 06:10

## 2021-10-27 RX ADMIN — LATANOPROST 1 DROP(S): 0.05 SOLUTION/ DROPS OPHTHALMIC; TOPICAL at 22:05

## 2021-10-27 RX ADMIN — HALOPERIDOL DECANOATE 5 MILLIGRAM(S): 100 INJECTION INTRAMUSCULAR at 13:32

## 2021-10-27 RX ADMIN — AMLODIPINE BESYLATE 5 MILLIGRAM(S): 2.5 TABLET ORAL at 06:10

## 2021-10-27 RX ADMIN — Medication 2 MILLIGRAM(S): at 17:28

## 2021-10-27 RX ADMIN — SIMVASTATIN 20 MILLIGRAM(S): 20 TABLET, FILM COATED ORAL at 22:05

## 2021-10-27 RX ADMIN — HALOPERIDOL DECANOATE 10 MILLIGRAM(S): 100 INJECTION INTRAMUSCULAR at 22:05

## 2021-10-27 NOTE — PROGRESS NOTE ADULT - ASSESSMENT
76 yr old male with hx of HLD, HTN, schizophrenia presents from [facility] for b/l leg weakness. Patient reports for past few month he noticed weakness of both legs. Today patient was unable to ambulate due to severe weakness and legs giving out.    #B/L LE weakness   #Hx of Schizophrenia  - antipsychotic med side effect vs parkinson's disease vs muscular deconditioning  - CT H: WNL   - F/u orthostatics   - f/u Vit B12, Folate, RPR   - psych recs appreciated   - neurology recs appreciated   - PT   - Patient is refusing all labs "hurts his legs". spoke to the patient with psych resident and patient states if we draw labs his "legs will die"   - f/u MR Brain     #OLIVER vs. CKD  - Cr 1.6 (baseline unknown); BUN 18  - c/w NS@77  - f/u urine studies     #HTN   - hold Enalapril  - amlodipine 5mg QD was started instead     #HLD  - c/w Statin     #Misc   - DVT prophylaxis: Heparin sq  - GI prophylaxis not indicated   - Diet: DASH/TLC   - Activity status: AAT   - Code status: Full code   - Disposition:   Pending: MRI      76 yr old male with hx of HLD, HTN, schizophrenia presents from [facility] for b/l leg weakness. Patient reports for past few month he noticed weakness of both legs. Today patient was unable to ambulate due to severe weakness and legs giving out.    #B/L LE weakness   #Hx of Schizophrenia  - antipsychotic med side effect vs parkinson's disease vs muscular deconditioning  - CT H: WNL   - F/u orthostatics   - f/u Vit B12, Folate, RPR   - psych recs appreciated   - neurology recs appreciated   - PT   - Patient is refusing all labs "hurts his legs". spoke to the patient with psych resident and patient states if we draw labs his "legs will die"   - MR Brain recommended by neuro but MRI questioners could not be done     #OLIVER vs. CKD  - Cr 1.6 (baseline unknown); BUN 18  - c/w NS@77  - f/u urine studies     #HTN   - hold Enalapril  - amlodipine 5mg QD was started instead     #HLD  - c/w Statin     #Misc   - DVT prophylaxis: Heparin sq  - GI prophylaxis not indicated   - Diet: DASH/TLC   - Activity status: AAT   - Code status: Full code   - Disposition:   Pending: ?MRI - waiting to hear back from neuro

## 2021-10-27 NOTE — PROGRESS NOTE ADULT - SUBJECTIVE AND OBJECTIVE BOX
----------Daily Progress Note----------    HISTORY OF PRESENT ILLNESS:  Patient is a 76y old Male who presents with a chief complaint of leg weakness (26 Oct 2021 11:54)    Currently admitted to medicine with the primary diagnosis of Weakness    77yo M with pmhx of HLD, HTN, schizophrenia presents from [facility] for b/l leg weakness. Patient almost fell when attempting to go to the bathroom. Said his legs gave out on him and denies any dizziness/syncope. Patient is otherwise poor historian saying he does not have HLD/HTN. Patient also states he has a tremor for about 15 years for which no doctor has given him treatment for. Leg weakness is also accompanied by pain which he cannot point to a specific part of his legs.     ED course:   vitals: /77, HR 76, afebrile, O2 99% RA   labs: Cr 1.6  imaging:   - CT head: wnl       Today is hospital day 2d.     INTERVAL HOSPITAL COURSE / OVERNIGHT EVENTS:  Patient was examined and seen at bedside. This morning he is resting comfortably in bed and reports no new issues or overnight events.     Review of Systems: Patient is confused. Denies any headache, dizziness, fever, chills. Patient denies chest pain, palpitation, SOB. Patient denies weakness       <<<<<PAST MEDICAL & SURGICAL HISTORY>>>>>  Schizophrenia    Hypertension    Hyperlipidemia    Glaucoma    No significant past surgical history      ALLERGIES  No Known Allergies      Home Medications:  benztropine 2 mg oral tablet: 1 tab(s) orally 2 times a day (25 Oct 2021 23:48)  busPIRone 15 mg oral tablet: 1 tab(s) orally 2 times a day (25 Oct 2021 23:48)  enalapril 2.5 mg oral tablet: 1 tab(s) orally once a day (25 Oct 2021 23:48)  haloperidol 10 mg oral tablet: 1 tab(s) orally once a day (at bedtime) (25 Oct 2021 23:48)  haloperidol 5 mg oral tablet: 1 tab(s) orally in am and 1pm  (25 Oct 2021 23:48)  latanoprost 0.005% ophthalmic solution: 1 drop(s) to each affected eye once a day (in the evening) (25 Oct 2021 23:48)  simvastatin 20 mg oral tablet: 1 tab(s) orally once a day (at bedtime) (25 Oct 2021 23:48)        MEDICATIONS  STANDING MEDICATIONS  amLODIPine   Tablet 5 milliGRAM(s) Oral daily  benztropine 2 milliGRAM(s) Oral two times a day  busPIRone 15 milliGRAM(s) Oral two times a day  chlorhexidine 4% Liquid 1 Application(s) Topical <User Schedule>  enoxaparin Injectable 40 milliGRAM(s) SubCutaneous daily  haloperidol     Tablet 5 milliGRAM(s) Oral <User Schedule>  haloperidol     Tablet 10 milliGRAM(s) Oral at bedtime  latanoprost 0.005% Ophthalmic Solution 1 Drop(s) Both EYES at bedtime  simvastatin 20 milliGRAM(s) Oral at bedtime  sodium chloride 0.9%. 1000 milliLiter(s) IV Continuous <Continuous>    PRN MEDICATIONS  acetaminophen     Tablet .. 650 milliGRAM(s) Oral every 6 hours PRN  LORazepam   Injectable 1 milliGRAM(s) IV Push once PRN    VITALS:  T(F): 96.9  HR: 96  BP: 129/83  RR: 18  SpO2: --    <<<<<LABS>>>>>                        13.7   5.55  )-----------( 97       ( 25 Oct 2021 17:10 )             38.3     10-25    140  |  106  |  18  ----------------------------<  107<H>  4.4   |  22  |  1.6<H>    Ca    9.2      25 Oct 2021 17:10  Mg     2.0     10-25    TPro  7.0  /  Alb  4.1  /  TBili  0.3  /  DBili  x   /  AST  16  /  ALT  18  /  AlkPhos  87  10-25            [id number]  CARDIAC MARKERS ( 25 Oct 2021 17:10 )  x     / <0.01 ng/mL / x     / x     / x            <<<<<RADIOLOGY>>>>>    < from: CT Head No Cont (10.25.21 @ 17:10) >    Impression:    1. No evidence of acute intracranial hemorrhage or mass effect.    --- End of Report ---    < end of copied text >      <<<<<PHYSICAL EXAM>>>>>  GENERAL: Well developed, well nourished and in no acute distress. Resting comfortably in bed.  PULMONARY: Clear to auscultation bilaterally. No rales, rhonchi, or wheezing.  CARDIOVASCULAR: Regular rate and rhythm, S1-S2, no murmurs  GASTROINTESTINAL: Soft, non-tender, non-distended, no guarding.  SKIN/EXTREMITIES: No clubbing or edema  NEUROLOGIC/MUSCULOSKELETAL: AOx1    -----------------------------------------------------------------------------------------------------------------------------------------------------------------------------------------------

## 2021-10-28 ENCOUNTER — TRANSCRIPTION ENCOUNTER (OUTPATIENT)
Age: 76
End: 2021-10-28

## 2021-10-28 VITALS
HEART RATE: 71 BPM | RESPIRATION RATE: 18 BRPM | DIASTOLIC BLOOD PRESSURE: 71 MMHG | TEMPERATURE: 97 F | SYSTOLIC BLOOD PRESSURE: 149 MMHG

## 2021-10-28 PROCEDURE — 99239 HOSP IP/OBS DSCHRG MGMT >30: CPT

## 2021-10-28 RX ADMIN — CHLORHEXIDINE GLUCONATE 1 APPLICATION(S): 213 SOLUTION TOPICAL at 06:16

## 2021-10-28 RX ADMIN — Medication 15 MILLIGRAM(S): at 17:11

## 2021-10-28 RX ADMIN — Medication 2 MILLIGRAM(S): at 17:11

## 2021-10-28 RX ADMIN — AMLODIPINE BESYLATE 5 MILLIGRAM(S): 2.5 TABLET ORAL at 06:17

## 2021-10-28 RX ADMIN — HALOPERIDOL DECANOATE 5 MILLIGRAM(S): 100 INJECTION INTRAMUSCULAR at 06:17

## 2021-10-28 RX ADMIN — Medication 15 MILLIGRAM(S): at 06:17

## 2021-10-28 RX ADMIN — HALOPERIDOL DECANOATE 5 MILLIGRAM(S): 100 INJECTION INTRAMUSCULAR at 11:45

## 2021-10-28 RX ADMIN — Medication 2 MILLIGRAM(S): at 06:17

## 2021-10-28 NOTE — PROGRESS NOTE ADULT - SUBJECTIVE AND OBJECTIVE BOX
----------Daily Progress Note----------    HISTORY OF PRESENT ILLNESS:  Patient is a 76y old Male who presents with a chief complaint of leg weakness (27 Oct 2021 07:06)    Currently admitted to medicine with the primary diagnosis of Weakness  77yo M with pmhx of HLD, HTN, schizophrenia presents from [facility] for b/l leg weakness. Patient almost fell when attempting to go to the bathroom. Said his legs gave out on him and denies any dizziness/syncope. Patient is otherwise poor historian saying he does not have HLD/HTN. Patient also states he has a tremor for about 15 years for which no doctor has given him treatment for. Leg weakness is also accompanied by pain which he cannot point to a specific part of his legs.     ED course:   vitals: /77, HR 76, afebrile, O2 99% RA   labs: Cr 1.6  imaging:   - CT head: wnl      Today is hospital day 3d.     INTERVAL HOSPITAL COURSE / OVERNIGHT EVENTS:    Patient was examined and seen at bedside. This morning he is resting comfortably in bed and reports no new issues or overnight events.     Review of Systems: Patient is confused. Denies any headache, dizziness, fever, chills. Patient denies chest pain, palpitation, SOB. Patient denies weakness       <<<<<PAST MEDICAL & SURGICAL HISTORY>>>>>  Schizophrenia    Hypertension    Hyperlipidemia    Glaucoma    No significant past surgical history      ALLERGIES  No Known Allergies      Home Medications:  benztropine 2 mg oral tablet: 1 tab(s) orally 2 times a day (25 Oct 2021 23:48)  busPIRone 15 mg oral tablet: 1 tab(s) orally 2 times a day (25 Oct 2021 23:48)  enalapril 2.5 mg oral tablet: 1 tab(s) orally once a day (25 Oct 2021 23:48)  haloperidol 10 mg oral tablet: 1 tab(s) orally once a day (at bedtime) (25 Oct 2021 23:48)  haloperidol 5 mg oral tablet: 1 tab(s) orally in am and 1pm  (25 Oct 2021 23:48)  latanoprost 0.005% ophthalmic solution: 1 drop(s) to each affected eye once a day (in the evening) (25 Oct 2021 23:48)  simvastatin 20 mg oral tablet: 1 tab(s) orally once a day (at bedtime) (25 Oct 2021 23:48)        MEDICATIONS  STANDING MEDICATIONS  amLODIPine   Tablet 5 milliGRAM(s) Oral daily  benztropine 2 milliGRAM(s) Oral two times a day  busPIRone 15 milliGRAM(s) Oral two times a day  chlorhexidine 4% Liquid 1 Application(s) Topical <User Schedule>  enoxaparin Injectable 40 milliGRAM(s) SubCutaneous daily  haloperidol     Tablet 5 milliGRAM(s) Oral <User Schedule>  haloperidol     Tablet 10 milliGRAM(s) Oral at bedtime  latanoprost 0.005% Ophthalmic Solution 1 Drop(s) Both EYES at bedtime  simvastatin 20 milliGRAM(s) Oral at bedtime    PRN MEDICATIONS  acetaminophen     Tablet .. 650 milliGRAM(s) Oral every 6 hours PRN  LORazepam   Injectable 1 milliGRAM(s) IV Push once PRN    VITALS:  T(F): 97.5  HR: 71  BP: 130/59  RR: 18  SpO2: 97%    <<<<<LABS>>>>>                  [id number]        <<<<<RADIOLOGY>>>>>  < from: CT Head No Cont (10.25.21 @ 17:10) >    Impression:    1. No evidence of acute intracranial hemorrhage or mass effect.    --- End of Report ---    < end of copied text >      <<<<<PHYSICAL EXAM>>>>>  GENERAL: Well developed, well nourished and in no acute distress. Resting comfortably in bed.  PULMONARY: Clear to auscultation bilaterally. No rales, rhonchi, or wheezing.  CARDIOVASCULAR: Regular rate and rhythm, S1-S2, no murmurs  GASTROINTESTINAL: Soft, non-tender, non-distended, no guarding.  SKIN/EXTREMITIES: No clubbing or edema  NEUROLOGIC/MUSCULOSKELETAL: AOx1      -----------------------------------------------------------------------------------------------------------------------------------------------------------------------------------------------

## 2021-10-28 NOTE — PROGRESS NOTE ADULT - ATTENDING COMMENTS
Patient seen and examined independently of medical resident unless otherwise stated    Vital Signs Last 24 Hrs  T(C): 36.4 (28 Oct 2021 04:56), Max: 36.4 (28 Oct 2021 04:56)  T(F): 97.5 (28 Oct 2021 04:56), Max: 97.5 (28 Oct 2021 04:56)  HR: 71 (28 Oct 2021 04:56) (71 - 73)  BP: 130/59 (28 Oct 2021 04:56) (130/59 - 145/66)  RR: 18 (28 Oct 2021 04:56) (18 - 18)  SpO2: 97% (28 Oct 2021 08:07) (95% - 97%)                        13.7   5.55  )-----------( 97       ( 25 Oct 2021 17:10 )             38.3   10-25    140  |  106  |  18  ----------------------------<  107<H>  4.4   |  22  |  1.6<H>    Ca    9.2      25 Oct 2021 17:10  Mg     2.0     10-25    TPro  7.0  /  Alb  4.1  /  TBili  0.3  /  DBili  x   /  AST  16  /  ALT  18  /  AlkPhos  87  10-25    MEDICATIONS  (STANDING):  amLODIPine   Tablet 5 milliGRAM(s) Oral daily  benztropine 2 milliGRAM(s) Oral two times a day  busPIRone 15 milliGRAM(s) Oral two times a day  chlorhexidine 4% Liquid 1 Application(s) Topical <User Schedule>  enoxaparin Injectable 40 milliGRAM(s) SubCutaneous daily  haloperidol     Tablet 5 milliGRAM(s) Oral <User Schedule>  haloperidol     Tablet 10 milliGRAM(s) Oral at bedtime  latanoprost 0.005% Ophthalmic Solution 1 Drop(s) Both EYES at bedtime  simvastatin 20 milliGRAM(s) Oral at bedtime  sodium chloride 0.9%. 1000 milliLiter(s) (75 mL/Hr) IV Continuous <Continuous>    MEDICATIONS  (PRN):  acetaminophen     Tablet .. 650 milliGRAM(s) Oral every 6 hours PRN Temp greater or equal to 38C (100.4F), Mild Pain (1 - 3)  LORazepam   Injectable 1 milliGRAM(s) IV Push once PRN MRI    -patient is from [facility]; uncooperative and refusing labs and work up   -MRI Head not ordered as questionnaire could not be answered (tried calling the facility for assistance but unsuccessful)  -PT c/s placed but patient uncooperative and refused to perform physical therapy   -oob to chair as tolerated  -patient is requesting to go back to his facility today; spoke to CM (working on it)      # Progress Note Handoff  PENDING as follows  consults: CM for d/c planning   Test: patient refused   Family discussion: discussed with patient   Disposition: to [facility] today     Attending Physician Dr. Priscila Wade # [id number]
Patient seen and examined independently of medical resident unless otherwise stated    Vital Signs Last 24 Hrs  T(C): 36.2 (27 Oct 2021 13:29), Max: 37.3 (26 Oct 2021 14:31)  T(F): 97.2 (27 Oct 2021 13:29), Max: 99.1 (26 Oct 2021 14:31)  HR: 71 (27 Oct 2021 13:29) (69 - 96)  BP: 145/66 (27 Oct 2021 13:29) (110/66 - 152/68)  BP(mean): --  RR: 18 (27 Oct 2021 13:29) (18 - 18)  SpO2: 95% (27 Oct 2021 13:29) (95% - 95%)                          13.7   5.55  )-----------( 97       ( 25 Oct 2021 17:10 )             38.3   10-25    140  |  106  |  18  ----------------------------<  107<H>  4.4   |  22  |  1.6<H>    Ca    9.2      25 Oct 2021 17:10  Mg     2.0     10-25    TPro  7.0  /  Alb  4.1  /  TBili  0.3  /  DBili  x   /  AST  16  /  ALT  18  /  AlkPhos  87  10-25    MEDICATIONS  (STANDING):  amLODIPine   Tablet 5 milliGRAM(s) Oral daily  benztropine 2 milliGRAM(s) Oral two times a day  busPIRone 15 milliGRAM(s) Oral two times a day  chlorhexidine 4% Liquid 1 Application(s) Topical <User Schedule>  enoxaparin Injectable 40 milliGRAM(s) SubCutaneous daily  haloperidol     Tablet 5 milliGRAM(s) Oral <User Schedule>  haloperidol     Tablet 10 milliGRAM(s) Oral at bedtime  latanoprost 0.005% Ophthalmic Solution 1 Drop(s) Both EYES at bedtime  simvastatin 20 milliGRAM(s) Oral at bedtime  sodium chloride 0.9%. 1000 milliLiter(s) (75 mL/Hr) IV Continuous <Continuous>    MEDICATIONS  (PRN):  acetaminophen     Tablet .. 650 milliGRAM(s) Oral every 6 hours PRN Temp greater or equal to 38C (100.4F), Mild Pain (1 - 3)  LORazepam   Injectable 1 milliGRAM(s) IV Push once PRN MRI    -patient is from [place] [facility]; uncooperative and refusing labs and work up   -MRI Head ?; neuro follow up for the absolute need inpatient as patient requesting anesthesia sedation  -PT c/s   -oob to chair as tolerated  -anticipate d/c planning 24 hrs once cleared by Neuro    Attending Physician Dr. Priscila Wade # [id number]
Patient seen by nocturnist in AM.   Patient seen and examined.   Patient is stable. Reports unable to ambulate due to "legs giving out while standing". Patient afraid to walk with PT.   Neuro eval: MRI brain.   Rest of plan as above.

## 2021-10-28 NOTE — DISCHARGE NOTE PROVIDER - NSDCCPCAREPLAN_GEN_ALL_CORE_FT
PRINCIPAL DISCHARGE DIAGNOSIS  Diagnosis: Weakness  Assessment and Plan of Treatment: Weakness is a lack of strength. You may feel weak all over your body (generalized), or you may feel weak in one specific part of your body (focal). There are many potential causes of weakness. Sometimes, the cause of your weakness may not be known. Some causes of weakness can be serious, so it is important to see your doctor.  Follow these instructions at home:  Rest as needed.  Try to get enough sleep. Talk to your doctor about how much sleep you need each night.  Take over-the-counter and prescription medicines only as told by your doctor.  Eat a healthy, well-balanced diet. This includes:  Proteins to build muscles, such as lean meats and fish.  Fresh fruits and vegetables.  Carbohydrates to boost energy, such as whole grains.  Drink enough fluid to keep your pee (urine) clear or pale yellow.  Do strength exercises, such as arm curls and leg raises, for 30 minutes at least 2 days a week or as told by your doctor.  Think about working with a physical therapist or  to help you get stronger.  Keep all follow-up visits as told by your doctor. This is important.  Contact a doctor if:  Your weakness does not get better or it gets worse.  Your weakness affects your ability to:  Think clearly.  Do your normal daily activities.  Get help right away if:  You have sudden weakness.  You have trouble breathing or shortness of breath.  You have problems with your vision.  You have trouble talking or swallowing.  You have trouble standing or walking.  You have chest pain.  You are light-headed.  You pass out (lose consciousness).  This information is not intended to replace advice given to you by your health care provider. Make sure you discuss any questions you have with your health care provider.

## 2021-10-28 NOTE — PROGRESS NOTE ADULT - TIME BILLING
direct patient care  -coordinated current plan of care with medical staff on MDR
direct patient care  -coordinated current plan of care with medical staff on MDR

## 2021-10-28 NOTE — DISCHARGE NOTE PROVIDER - HOSPITAL COURSE
77yo M with pmhx of HLD, HTN, schizophrenia presents from [facility] for b/l leg weakness. Patient almost fell when attempting to go to the bathroom. Said his legs gave out on him and denies any dizziness/syncope. Patient is otherwise poor historian saying he does not have HLD/HTN. Patient also states he has a tremor for about 15 years for which no doctor has given him treatment for. Leg weakness is also accompanied by pain which he cannot point to a specific part of his legs. CT H was within normal limit. Neurology and psychiatry was consulted for further evaluation. While inpatient, patient denies all lab works. Multiple attempts were made and patient adamantly refused lab works. MrRI was recommended by neurology and was able to obtain because to insufficient evidence to r/o any contraindication to MRI. CT head was repeated. 77yo M with pmhx of HLD, HTN, schizophrenia presents from [facility] for b/l leg weakness. Patient almost fell when attempting to go to the bathroom. Said his legs gave out on him and denies any dizziness/syncope. Patient is otherwise poor historian saying he does not have HLD/HTN. Patient also states he has a tremor for about 15 years for which no doctor has given him treatment for. Leg weakness is also accompanied by pain which he cannot point to a specific part of his legs. CT H was within normal limit. Neurology and psychiatry was consulted for further evaluation. While inpatient, patient denies all lab works. Multiple attempts were made and patient adamantly refused lab works. MRI was recommended by neurology and was not able to obtain because of insufficient evidence to r/o any contraindication to MRI. CT head was repeated.     pt seen and examined this morning; Physical therapy done and patient is stable for d/c back to [facility] as per case management     spent over 35 mins d/c planning

## 2021-10-28 NOTE — PROGRESS NOTE ADULT - ASSESSMENT
76 yr old male with hx of HLD, HTN, schizophrenia presents from [facility] for b/l leg weakness. Patient reports for past few month he noticed weakness of both legs. Today patient was unable to ambulate due to severe weakness and legs giving out.    #B/L LE weakness   #Hx of Schizophrenia  - antipsychotic med side effect vs parkinson's disease vs muscular deconditioning  - CT H: WNL   - F/u orthostatics   - f/u Vit B12, Folate, RPR   - psych recs appreciated   - neurology recs appreciated   - PT   - Patient is refusing all labs "hurts his legs". spoke to the patient with psych resident and patient states if we draw labs his "legs will die"   - MR Brain recommended by neuro but MRI questioners could not be done   - called neuro multiple times, still waiting for answer     #OLIVER vs. CKD  - Cr 1.6 (baseline unknown); BUN 18  - c/w NS@77  - f/u urine studies     #HTN   - hold Enalapril  - amlodipine 5mg QD was started instead     #HLD  - c/w Statin     #Misc   - DVT prophylaxis: Heparin sq  - GI prophylaxis not indicated   - Diet: DASH/TLC   - Activity status: AAT   - Code status: Full code   - Disposition:   Pending: ?MRI - waiting to hear back from neuro, d/c planning    76 yr old male with hx of HLD, HTN, schizophrenia presents from [facility] for b/l leg weakness. Patient reports for past few month he noticed weakness of both legs. Today patient was unable to ambulate due to severe weakness and legs giving out.    #B/L LE weakness   #Hx of Schizophrenia  - antipsychotic med side effect vs parkinson's disease vs muscular deconditioning  - CT H: WNL   - F/u orthostatics   - f/u Vit B12, Folate, RPR   - psych recs appreciated   - neurology recs appreciated   - PT   - Patient is refusing all labs "hurts his legs". spoke to the patient with psych resident and patient states if we draw labs his "legs will die"   - MR Brain recommended by neuro but MRI questioners could not be done   - spoke to neuro and they recommended repeat CT H instead of MRI   - F/U repeat CT H     #OLIVER vs. CKD  - Cr 1.6 (baseline unknown); BUN 18  - c/w NS@77  - f/u urine studies     #HTN   - hold Enalapril  - amlodipine 5mg QD was started instead     #HLD  - c/w Statin     #Misc   - DVT prophylaxis: Heparin sq  - GI prophylaxis not indicated   - Diet: DASH/TLC   - Activity status: AAT   - Code status: Full code   - Disposition:   Pending: Repeat CT H per neuro, d/c planning    76 yr old male with hx of HLD, HTN, schizophrenia presents from [facility] for b/l leg weakness. Patient reports for past few month he noticed weakness of both legs. Today patient was unable to ambulate due to severe weakness and legs giving out.    #B/L LE weakness   #Hx of Schizophrenia  - antipsychotic med side effect vs parkinson's disease vs muscular deconditioning  - CT H: WNL   - F/u orthostatics   - f/u Vit B12, Folate, RPR   - psych recs appreciated   - neurology recs appreciated   - PT   - Patient is refusing all labs "hurts his legs". spoke to the patient with psych resident and patient states if we draw labs his "legs will die"   - MR Brain recommended by neuro but MRI questioners could not be done   - spoke to neuro and they recommended repeat CT H instead of MRI   - F/U repeat CT H : Patient refused     #OLIVER vs. CKD  - Cr 1.6 (baseline unknown); BUN 18  - c/w NS@77  - f/u urine studies     #HTN   - hold Enalapril  - amlodipine 5mg QD was started instead     #HLD  - c/w Statin     #Misc   - DVT prophylaxis: Heparin sq  - GI prophylaxis not indicated   - Diet: DASH/TLC   - Activity status: AAT   - Code status: Full code   - Disposition:   Pending: Repeat CT H per neuro, d/c planning

## 2021-10-28 NOTE — DISCHARGE NOTE PROVIDER - NSDCMRMEDTOKEN_GEN_ALL_CORE_FT
benztropine 2 mg oral tablet: 1 tab(s) orally 2 times a day  busPIRone 15 mg oral tablet: 1 tab(s) orally 2 times a day  enalapril 2.5 mg oral tablet: 1 tab(s) orally once a day  haloperidol 10 mg oral tablet: 1 tab(s) orally once a day (at bedtime)  haloperidol 5 mg oral tablet: 1 tab(s) orally in am and 1pm   latanoprost 0.005% ophthalmic solution: 1 drop(s) to each affected eye once a day (in the evening)  simvastatin 20 mg oral tablet: 1 tab(s) orally once a day (at bedtime)

## 2021-11-16 NOTE — CHART NOTE - NSCHARTNOTEFT_GEN_A_CORE
I, Katerin Chávez MD attest that, to the best of my knowledge based on clinical presentation and findings documented in the medical record, this patient did not meet the criteria for a diagnosis of Acute Kidney Injury (OLIVER).

## 2022-07-10 NOTE — BEHAVIORAL HEALTH ASSESSMENT NOTE - PAST PSYCHOTROPIC MEDICATION
May give Tylenol or Motrin as needed    Follow-up with pediatrician if symptoms persist
Details unknown

## 2022-10-13 ENCOUNTER — INPATIENT (INPATIENT)
Facility: HOSPITAL | Age: 77
LOS: 1 days | Discharge: PSYCHIATRIC OR SUBSTANCE USE FACILITY | End: 2022-10-15
Attending: HOSPITALIST | Admitting: HOSPITALIST

## 2022-10-13 VITALS
HEART RATE: 98 BPM | RESPIRATION RATE: 18 BRPM | DIASTOLIC BLOOD PRESSURE: 83 MMHG | SYSTOLIC BLOOD PRESSURE: 138 MMHG | HEIGHT: 73 IN | OXYGEN SATURATION: 94 % | TEMPERATURE: 100 F

## 2022-10-13 LAB
ALBUMIN SERPL ELPH-MCNC: 4.2 G/DL — SIGNIFICANT CHANGE UP (ref 3.5–5.2)
ALP SERPL-CCNC: 86 U/L — SIGNIFICANT CHANGE UP (ref 30–115)
ALT FLD-CCNC: 14 U/L — SIGNIFICANT CHANGE UP (ref 0–41)
ANION GAP SERPL CALC-SCNC: 14 MMOL/L — SIGNIFICANT CHANGE UP (ref 7–14)
AST SERPL-CCNC: 17 U/L — SIGNIFICANT CHANGE UP (ref 0–41)
BASOPHILS # BLD AUTO: 0.02 K/UL — SIGNIFICANT CHANGE UP (ref 0–0.2)
BASOPHILS NFR BLD AUTO: 0.2 % — SIGNIFICANT CHANGE UP (ref 0–1)
BILIRUB SERPL-MCNC: 0.4 MG/DL — SIGNIFICANT CHANGE UP (ref 0.2–1.2)
BUN SERPL-MCNC: 22 MG/DL — HIGH (ref 10–20)
CALCIUM SERPL-MCNC: 9 MG/DL — SIGNIFICANT CHANGE UP (ref 8.4–10.4)
CHLORIDE SERPL-SCNC: 106 MMOL/L — SIGNIFICANT CHANGE UP (ref 98–110)
CK SERPL-CCNC: 143 U/L — SIGNIFICANT CHANGE UP (ref 0–225)
CO2 SERPL-SCNC: 23 MMOL/L — SIGNIFICANT CHANGE UP (ref 17–32)
CREAT SERPL-MCNC: 1.5 MG/DL — SIGNIFICANT CHANGE UP (ref 0.7–1.5)
EGFR: 48 ML/MIN/1.73M2 — LOW
EOSINOPHIL # BLD AUTO: 0.01 K/UL — SIGNIFICANT CHANGE UP (ref 0–0.7)
EOSINOPHIL NFR BLD AUTO: 0.1 % — SIGNIFICANT CHANGE UP (ref 0–8)
GLUCOSE SERPL-MCNC: 114 MG/DL — HIGH (ref 70–99)
HCT VFR BLD CALC: 39.3 % — LOW (ref 42–52)
HGB BLD-MCNC: 13.8 G/DL — LOW (ref 14–18)
IMM GRANULOCYTES NFR BLD AUTO: 0.3 % — SIGNIFICANT CHANGE UP (ref 0.1–0.3)
LYMPHOCYTES # BLD AUTO: 1.33 K/UL — SIGNIFICANT CHANGE UP (ref 1.2–3.4)
LYMPHOCYTES # BLD AUTO: 14.8 % — LOW (ref 20.5–51.1)
MCHC RBC-ENTMCNC: 30.4 PG — SIGNIFICANT CHANGE UP (ref 27–31)
MCHC RBC-ENTMCNC: 35.1 G/DL — SIGNIFICANT CHANGE UP (ref 32–37)
MCV RBC AUTO: 86.6 FL — SIGNIFICANT CHANGE UP (ref 80–94)
MONOCYTES # BLD AUTO: 0.81 K/UL — HIGH (ref 0.1–0.6)
MONOCYTES NFR BLD AUTO: 9 % — SIGNIFICANT CHANGE UP (ref 1.7–9.3)
NEUTROPHILS # BLD AUTO: 6.77 K/UL — HIGH (ref 1.4–6.5)
NEUTROPHILS NFR BLD AUTO: 75.6 % — HIGH (ref 42.2–75.2)
NRBC # BLD: 0 /100 WBCS — SIGNIFICANT CHANGE UP (ref 0–0)
PLATELET # BLD AUTO: 118 K/UL — LOW (ref 130–400)
POTASSIUM SERPL-MCNC: 4.3 MMOL/L — SIGNIFICANT CHANGE UP (ref 3.5–5)
POTASSIUM SERPL-SCNC: 4.3 MMOL/L — SIGNIFICANT CHANGE UP (ref 3.5–5)
PROT SERPL-MCNC: 7 G/DL — SIGNIFICANT CHANGE UP (ref 6–8)
RBC # BLD: 4.54 M/UL — LOW (ref 4.7–6.1)
RBC # FLD: 13.9 % — SIGNIFICANT CHANGE UP (ref 11.5–14.5)
SODIUM SERPL-SCNC: 143 MMOL/L — SIGNIFICANT CHANGE UP (ref 135–146)
TROPONIN T SERPL-MCNC: <0.01 NG/ML — SIGNIFICANT CHANGE UP
WBC # BLD: 8.97 K/UL — SIGNIFICANT CHANGE UP (ref 4.8–10.8)
WBC # FLD AUTO: 8.97 K/UL — SIGNIFICANT CHANGE UP (ref 4.8–10.8)

## 2022-10-13 PROCEDURE — 93010 ELECTROCARDIOGRAM REPORT: CPT

## 2022-10-13 PROCEDURE — 71045 X-RAY EXAM CHEST 1 VIEW: CPT | Mod: 26

## 2022-10-13 PROCEDURE — 72170 X-RAY EXAM OF PELVIS: CPT | Mod: 26

## 2022-10-13 PROCEDURE — 99284 EMERGENCY DEPT VISIT MOD MDM: CPT

## 2022-10-13 NOTE — ED PROVIDER NOTE - PROGRESS NOTE DETAILS
pt well appearing, no signs of trauma, no confusion, no acute complaints, no infectious complaints  given reported generalized weakness, will get screening labs, ekg/trop, cpk  r/o anemia, lyte abn, ajith  no e/o or report of head trauma  will provide supportive care, serial exam and ED observation period pt awake, alert, no complaint of pain but states he feels unsteady with standing. pt stood and unsteady. will add mag level, cth, covid test CO- pt endorsed to Dr. Dickson- pending ct, mag, reassess, dispo

## 2022-10-13 NOTE — ED PROVIDER NOTE - PHYSICAL EXAMINATION
PHYSICAL EXAM:    Constitutional: awake, alert, NAD  Eyes: EOMI, no conj injection  HENT: NC AT  Back: no c/t/l spine ttp  Respiratory: no respiratory distress, breath sounds equal b/l, no wheezing, rhonchi or stridor.   Cardiovascular: RRR nml S1S2  Gastrointestinal: soft, no masses, nontender, nondistended. No guarding or rebound.   Extremities: no peripheral edema  Neurological: AAOx3, CN II-XII grossly intact, no focal numbness or focal weakness  Skin: no rash  Musculoskeletal: no gross deformity, no ttp to shoulders, upper arms, elbows, forearms, wrists, hands, hips, thighs, knees, lower legs, ankles, feet PHYSICAL EXAM:    Constitutional: awake, alert, NAD  Eyes: EOMI, no conj injection  HENT: NC AT  Back: no c/t/l spine ttp  Respiratory: no respiratory distress, breath sounds equal b/l, no wheezing, rhonchi or stridor.   Cardiovascular: RRR nml S1S2  Gastrointestinal: soft, no masses, nontender, nondistended. No guarding or rebound.   Extremities: no peripheral edema  Neurological: AAOx 2(person, place), CN II-XII grossly intact, no focal numbness or focal weakness, EOMI, no nystagmus, normal finger to nose b/l, mild tremulousness to upper extremities (baseline per pt), unsteady on standing (no pain on standing )  Skin: no rash  Musculoskeletal: no gross deformity, no ttp to shoulders, upper arms, elbows, forearms, wrists, hands, hips, thighs, knees, lower legs, ankles, feet

## 2022-10-13 NOTE — ED ADULT TRIAGE NOTE - CHIEF COMPLAINT QUOTE
as per ems pt states he tripped and fell no loc no anticoag as per ems pt states he got weak, legs gave out fell no loc no anticoag. ems states pt could not walk after

## 2022-10-13 NOTE — ED PROVIDER NOTE - CLINICAL SUMMARY MEDICAL DECISION MAKING FREE TEXT BOX
Case signed out to me by Dr. Reid -- patient from [facility] here for assessment sp fall with new weakness and inability to ambulate. Continues to have non focal neuro exam but unable to ambulate.    Labs unremarkable, CT head, CXR, PXR without acute injury.    Will admit for PT eval for ambulatory dysfunction related to weakness, does not seem to be related to CVA or other central neurologic issue.

## 2022-10-13 NOTE — ED ADULT NURSE NOTE - NSIMPLEMENTINTERV_GEN_ALL_ED
Implemented All Fall Risk Interventions:  [place] to call system. Call bell, personal items and telephone within reach. Instruct patient to call for assistance. Room bathroom lighting operational. Non-slip footwear when patient is off stretcher. Physically safe environment: no spills, clutter or unnecessary equipment. Stretcher in lowest position, wheels locked, appropriate side rails in place. Provide visual cue, wrist band, yellow gown, etc. Monitor gait and stability. Monitor for mental status changes and reorient to person, place, and time. Review medications for side effects contributing to fall risk. Reinforce activity limits and safety measures with patient and family.

## 2022-10-13 NOTE — ED PROVIDER NOTE - OBJECTIVE STATEMENT
78 yo m hx schizophrenia, thrombocytopenia, htn, hld  pt presents from [facility]. pt presents s/p fall. pt states he was getting out of bed, tripped on his feet and landed on hands/knees. pt states he felt too weak to get up on his own. staff helped pt up and pt was sent to ed for eval. in ed, pt w/o acute complaints. pt states he does not feel weak now.  pt denies cp, sob, fever, chills, cough, congestion, ap, headache, neck pain.  pt requesting to go back home.

## 2022-10-13 NOTE — ED ADULT NURSE NOTE - CHIEF COMPLAINT QUOTE
as per ems pt states he got weak, legs gave out fell no loc no anticoag. ems states pt could not walk after

## 2022-10-14 ENCOUNTER — TRANSCRIPTION ENCOUNTER (OUTPATIENT)
Age: 77
End: 2022-10-14

## 2022-10-14 VITALS
OXYGEN SATURATION: 95 % | DIASTOLIC BLOOD PRESSURE: 64 MMHG | HEART RATE: 82 BPM | TEMPERATURE: 99 F | RESPIRATION RATE: 18 BRPM | SYSTOLIC BLOOD PRESSURE: 126 MMHG

## 2022-10-14 PROBLEM — H40.9 UNSPECIFIED GLAUCOMA: Chronic | Status: ACTIVE | Noted: 2021-10-25

## 2022-10-14 PROBLEM — F20.9 SCHIZOPHRENIA, UNSPECIFIED: Chronic | Status: ACTIVE | Noted: 2021-10-25

## 2022-10-14 PROBLEM — E78.5 HYPERLIPIDEMIA, UNSPECIFIED: Chronic | Status: ACTIVE | Noted: 2021-10-25

## 2022-10-14 PROBLEM — I10 ESSENTIAL (PRIMARY) HYPERTENSION: Chronic | Status: ACTIVE | Noted: 2021-10-25

## 2022-10-14 LAB
MAGNESIUM SERPL-MCNC: 2 MG/DL — SIGNIFICANT CHANGE UP (ref 1.8–2.4)
SARS-COV-2 RNA SPEC QL NAA+PROBE: SIGNIFICANT CHANGE UP

## 2022-10-14 PROCEDURE — 70450 CT HEAD/BRAIN W/O DYE: CPT | Mod: 26,MA

## 2022-10-14 PROCEDURE — 99221 1ST HOSP IP/OBS SF/LOW 40: CPT | Mod: GC

## 2022-10-14 RX ORDER — HALOPERIDOL DECANOATE 100 MG/ML
15 INJECTION INTRAMUSCULAR AT BEDTIME
Refills: 0 | Status: DISCONTINUED | OUTPATIENT
Start: 2022-10-14 | End: 2022-10-15

## 2022-10-14 RX ORDER — INFLUENZA VIRUS VACCINE 15; 15; 15; 15 UG/.5ML; UG/.5ML; UG/.5ML; UG/.5ML
0.7 SUSPENSION INTRAMUSCULAR ONCE
Refills: 0 | Status: COMPLETED | OUTPATIENT
Start: 2022-10-14 | End: 2022-10-14

## 2022-10-14 RX ORDER — SIMVASTATIN 20 MG/1
20 TABLET, FILM COATED ORAL AT BEDTIME
Refills: 0 | Status: DISCONTINUED | OUTPATIENT
Start: 2022-10-14 | End: 2022-10-15

## 2022-10-14 RX ORDER — HALOPERIDOL DECANOATE 100 MG/ML
5 INJECTION INTRAMUSCULAR DAILY
Refills: 0 | Status: DISCONTINUED | OUTPATIENT
Start: 2022-10-14 | End: 2022-10-15

## 2022-10-14 RX ORDER — BENZTROPINE MESYLATE 1 MG
2 TABLET ORAL
Refills: 0 | Status: DISCONTINUED | OUTPATIENT
Start: 2022-10-14 | End: 2022-10-15

## 2022-10-14 RX ORDER — HEPARIN SODIUM 5000 [USP'U]/ML
5000 INJECTION INTRAVENOUS; SUBCUTANEOUS EVERY 8 HOURS
Refills: 0 | Status: DISCONTINUED | OUTPATIENT
Start: 2022-10-14 | End: 2022-10-15

## 2022-10-14 RX ORDER — LATANOPROST 0.05 MG/ML
1 SOLUTION/ DROPS OPHTHALMIC; TOPICAL AT BEDTIME
Refills: 0 | Status: DISCONTINUED | OUTPATIENT
Start: 2022-10-14 | End: 2022-10-15

## 2022-10-14 RX ADMIN — HALOPERIDOL DECANOATE 5 MILLIGRAM(S): 100 INJECTION INTRAMUSCULAR at 12:27

## 2022-10-14 RX ADMIN — HALOPERIDOL DECANOATE 15 MILLIGRAM(S): 100 INJECTION INTRAMUSCULAR at 21:59

## 2022-10-14 RX ADMIN — Medication 2 MILLIGRAM(S): at 18:38

## 2022-10-14 RX ADMIN — LATANOPROST 1 DROP(S): 0.05 SOLUTION/ DROPS OPHTHALMIC; TOPICAL at 21:59

## 2022-10-14 RX ADMIN — Medication 15 MILLIGRAM(S): at 18:38

## 2022-10-14 RX ADMIN — HEPARIN SODIUM 5000 UNIT(S): 5000 INJECTION INTRAVENOUS; SUBCUTANEOUS at 02:26

## 2022-10-14 RX ADMIN — SIMVASTATIN 20 MILLIGRAM(S): 20 TABLET, FILM COATED ORAL at 21:58

## 2022-10-14 NOTE — DISCHARGE NOTE PROVIDER - HOSPITAL COURSE
76 yo M with PMHx of HLD, HTN, Schizophrenia presents from [facility] for lower extremity weakness. Pt reports that for the past several years, he has been feeling weak and deconditioned. States that typically, he is sedentary, does not exercise or ambulate much. Intermittently, his lower extremities "buckle", causing him to fall. Pt denies any lightheadedness, syncope, chest pain, SOB, palpitations, pain. Similar admission 1 year ago for which he underwent work up, Neuro eval and no significant pathologies were identified as cause, likely attributable to deconditioning.  In the ED, VSS. Labs unremarkable. CT Head normal. Pt admitted to floor for monitoring where he refused labs, some medications. Ambulated with PT,

## 2022-10-14 NOTE — CONSULT NOTE ADULT - SUBJECTIVE AND OBJECTIVE BOX
HPI:  76 yo M with PMHx of HLD, HTN, Schizophrenia presents from [facility] for lower extremity weakness. Pt reports that for the past several years, he has been feeling weak and deconditioned. States that typically, he is sedentary, does not exercise or ambulate much. Intermittently, his lower extremities "buckle", causing him to fall. Pt denies any lightheadedness, syncope, chest pain, SOB, palpitations, pain. Similar admission 1 year ago for which he underwent work up, Neuro eval and no significant pathologies were identified as cause, likely attributable to deconditioning.  In the ED, VSS. Labs unremarkable. CT Head normal.       PAST MEDICAL & SURGICAL HISTORY:  Schizophrenia      Hypertension      Hyperlipidemia      Glaucoma      No significant past surgical history          Hospital Course:    TODAY'S SUBJECTIVE & REVIEW OF SYMPTOMS:     Constitutional WNL   Cardio WNL   Resp WNL   GI WNL  Heme WNL  Endo WNL  Skin WNL  MSK Weakness  Neuro WNL  Cognitive WNL  Psych WNL      MEDICATIONS  (STANDING):  benztropine 2 milliGRAM(s) Oral two times a day  busPIRone 15 milliGRAM(s) Oral two times a day  enalapril 2.5 milliGRAM(s) Oral daily  haloperidol     Tablet 15 milliGRAM(s) Oral at bedtime  haloperidol     Tablet 5 milliGRAM(s) Oral daily  heparin   Injectable 5000 Unit(s) SubCutaneous every 8 hours  influenza  Vaccine (HIGH DOSE) 0.7 milliLiter(s) IntraMuscular once  latanoprost 0.005% Ophthalmic Solution 1 Drop(s) Both EYES at bedtime  simvastatin 20 milliGRAM(s) Oral at bedtime    MEDICATIONS  (PRN):      FAMILY HISTORY:  No pertinent family history in first degree relatives        Allergies    No Known Allergies    Intolerances        SOCIAL HISTORY:    [  ] Etoh  [  ] Smoking  [  ] Substance abuse     Home Environment:  [   ] Home Alone  [   ] Lives with Family  [   ] Home Health Aid    Dwelling:  [ x  ] [facility]  [   ] Private House  [   ] Adult Home  [   ] Skilled Nursing Facility      [   ] Short Term  [   ] Long Term  [   ] Stairs       Elevator [   ]    FUNCTIONAL STATUS PTA: (Check all that apply)  Ambulation: [  x  ]Independent    [   ] Dependent     [   ] Non-Ambulatory  Assistive Device: [   ] SA Cane  [   ]  Q Cane  [   ] Walker  [   ]  Wheelchair  ADL : [  x ] Independent  [    ]  Dependent       Vital Signs Last 24 Hrs  T(C): 37.5 (14 Oct 2022 13:00), Max: 38.6 (14 Oct 2022 04:45)  T(F): 99.5 (14 Oct 2022 13:00), Max: 101.4 (14 Oct 2022 04:45)  HR: 85 (14 Oct 2022 13:00) (84 - 98)  BP: 127/57 (14 Oct 2022 13:00) (127/57 - 138/83)  BP(mean): --  RR: 18 (14 Oct 2022 13:00) (18 - 18)  SpO2: 95% (14 Oct 2022 02:20) (94% - 95%)    Parameters below as of 14 Oct 2022 02:20  Patient On (Oxygen Delivery Method): room air          PHYSICAL EXAM: Awake & Alert  GENERAL: NAD  HEAD:  Normocephalic  CHEST/LUNG: Clear   HEART: S1S2+  ABDOMEN: Soft, Nontender  EXTREMITIES:  no calf tenderness    NERVOUS SYSTEM:  Cranial Nerves 2-12 intact [   ] Abnormal  [   ]  ROM: WFL all extremities [   ]  Abnormal [   ]able to move all ext - limited participation  Motor Strength: WFL all extremities  [   ]  Abnormal [   ]  Sensation: intact to light touch [ x  ] Abnormal [   ]    FUNCTIONAL STATUS:  Bed Mobility: Independent [   ]  Supervision [   ]  Needs Assistance [ x  ]  N/A [   ]  Transfers: Independent [   ]  Supervision [   ]  Needs Assistance [   ]  N/A [   ]   Ambulation: Independent [   ]  Supervision [   ]  Needs Assistance [   ]  N/A [   ]  ADL: Independent [   ] Requires Assistance [   ] N/A [   ]      LABS:                        13.8   8.97  )-----------( 118      ( 13 Oct 2022 19:20 )             39.3     10-13    143  |  106  |  22<H>  ----------------------------<  114<H>  4.3   |  23  |  1.5    Ca    9.0      13 Oct 2022 19:20  Mg     2.0     10-13    TPro  7.0  /  Alb  4.2  /  TBili  0.4  /  DBili  x   /  AST  17  /  ALT  14  /  AlkPhos  86  10-13          RADIOLOGY & ADDITIONAL STUDIES:

## 2022-10-14 NOTE — H&P ADULT - ASSESSMENT
78 yo M with PMHx of HLD, HTN, CKD, Schizophrenia presents from [facility] for lower extremity weakness.    #Lower Extremity Weakness likely secondary to deconditioning  - CT head unremarkable, physical exam gross unremarkable for significant neurological deficits  - Similar admission1 yr ago with negative workup  - Pending PT eval  - Likely discharge after PT eval    #HTN  - C/w Enalapril    #CKD  - Cr at baseline    #Schizophrenia  - C/w Haldol, Benztropine    DVT ppx: HSQ  Activity: AAT  Diet: DASH  Full Code  Dispo: D/c after PT

## 2022-10-14 NOTE — PHYSICAL THERAPY INITIAL EVALUATION ADULT - PERTINENT HX OF CURRENT PROBLEM, REHAB EVAL
78 yo M with PMHx of HLD, HTN, Schizophrenia presents from [facility] for lower extremity weakness. Pt reports that for the past several years, he has been feeling weak and deconditioned. States that typically, he is sedentary, does not exercise or ambulate much. Intermittently, his lower extremities "buckle", causing him to fall. Pt denies any lightheadedness, syncope, chest pain, SOB, palpitations, pain. Similar admission 1 year ago for which he underwent work up, Neuro eval and no significant pathologies were identified as cause, likely attributable to deconditioning.

## 2022-10-14 NOTE — H&P ADULT - ATTENDING COMMENTS
Patient seen and examined independently on 4B.   Not fully engaging in H&P and P/E because "I need to sleep".     PAST MEDICAL & SURGICAL HISTORY:  Schizophrenia  Hypertension  Hyperlipidemia  Glaucoma      Vitals:  T(F): 101.4 (10-14-22 @ 04:45)  HR: 84 (10-14-22 @ 04:45)  BP: 128/62 (10-14-22 @ 04:45)  RR: 18 (10-14-22 @ 04:45) on RA    TESTS & MEASUREMENTS:                        13.8   8.97  )-----------( 118      ( 13 Oct 2022 19:20 )             39.3       10-13    143  |  106  |  22<H>  ----------------------------<  114<H>  4.3   |  23  |  1.5    Ca    9.0      13 Oct 2022 19:20  Mg     2.0     10-13    TPro  7.0  /  Alb  4.2  /  TBili  0.4  /  DBili  x   /  AST  17  /  ALT  14  /  AlkPhos  86  10-13    LIVER FUNCTIONS - ( 13 Oct 2022 19:20 )  Alb: 4.2 g/dL / Pro: 7.0 g/dL / ALK PHOS: 86 U/L / ALT: 14 U/L / AST: 17 U/L / GGT: x           CARDIAC MARKERS ( 13 Oct 2022 19:20 )  x     / <0.01 ng/mL / 143 U/L / x     / x          In summary:  HEALTH ISSUES - PROBLEM Dx:  .. Lower extremity weakness, likely deconditioning. No evidence of neuromuscular deficit on neuro exam (patient not willing to fully engage or follow commands), not willing to get out of bed.    .. Inpatient psych resident / schizophrenia on therapy  .. Denies any bladder/fecal incontinence   .. Denies new back pain or lower ext pain    PLAN  .. PT/Rehab evaluation and will need PT pgm as outpatient   .. In case of persistent profound LE weakness, may consider complete neuro eval and neuroimaging as outpatient related to r/o spinal stenosis.

## 2022-10-14 NOTE — DISCHARGE NOTE PROVIDER - NSDCCPCAREPLAN_GEN_ALL_CORE_FT
PRINCIPAL DISCHARGE DIAGNOSIS  Diagnosis: Ambulatory dysfunction  Assessment and Plan of Treatment: Weakness is a lack of strength. You may feel weak all over your body (generalized), or you may feel weak in one specific part of your body (focal). There are many potential causes of weakness. Sometimes, the cause of your weakness may not be known. Some causes of weakness can be serious, so it is important to see your doctor.  Follow these instructions at home:  Rest as needed.  Try to get enough sleep. Talk to your doctor about how much sleep you need each night.  Take over-the-counter and prescription medicines only as told by your doctor.  Eat a healthy, well-balanced diet. This includes:  Proteins to build muscles, such as lean meats and fish.  Fresh fruits and vegetables.  Carbohydrates to boost energy, such as whole grains.  Drink enough fluid to keep your pee (urine) clear or pale yellow.  Do strength exercises, such as arm curls and leg raises, for 30 minutes at least 2 days a week or as told by your doctor.  Think about working with a physical therapist or  to help you get stronger.  Keep all follow-up visits as told by your doctor. This is important.  Contact a doctor if:  Your weakness does not get better or it gets worse.  Your weakness affects your ability to:  Think clearly.  Do your normal daily activities.  Get help right away if:  You have sudden weakness.  You have trouble breathing or shortness of breath.  You have problems with your vision.  You have trouble talking or swallowing.  You have trouble standing or walking.  You have chest pain.  You are light-headed.  You pass out (lose consciousness).  This information is not intended to replace advice given to you by your health care provider. Make sure you discuss any questions you have with your health care provider.

## 2022-10-14 NOTE — PATIENT PROFILE ADULT - FALL HARM RISK - HARM RISK INTERVENTIONS
Assistance with ambulation/Assistance OOB with selected safe patient handling equipment/Communicate Risk of Fall with Harm to all staff/Discuss with provider need for PT consult/Monitor gait and stability/Reinforce activity limits and safety measures with patient and family/Tailored Fall Risk Interventions/Visual Cue: Yellow wristband and red socks/Bed in lowest position, wheels locked, appropriate side rails in place/Call bell, personal items and telephone in reach/Instruct patient to call for assistance before getting out of bed or chair/Non-slip footwear when patient is out of bed/[place] to call system/Physically safe environment - no spills, clutter or unnecessary equipment/Purposeful Proactive Rounding/Room/bathroom lighting operational, light cord in reach

## 2022-10-14 NOTE — PHYSICAL THERAPY INITIAL EVALUATION ADULT - ADDITIONAL COMMENTS
Patient not a good source of information regarding prior level of function. Inconsistent in answering questions.

## 2022-10-14 NOTE — CONSULT NOTE ADULT - ASSESSMENT
IMPRESSION: Rehab of gait dysfunction     PRECAUTIONS: [   ] Cardiac  [   ] Respiratory  [   ] Seizures [   ] Contact Isolation  [   ] Droplet Isolation  [   ] Other    Weight Bearing Status:     RECOMMENDATION:    Out of Bed to Chair     DVT/Decubiti Prophylaxis    REHAB PLAN:     [ x ] Bedside P/T 3-5 times a week   [    ]   Bedside O/T  2-3 times a week             [    ] Speech Therapy               [    ]  No Rehab Therapy Indicated   Conditioning/ROM                                    ADL  Bed Mobility                                               Conditioning/ROM  Transfers                                                     Bed Mobility  Sitting /Standing Balance                         Transfers                                        Gait Training                                               Sitting/Standing Balance  Stair Training [   ]Applicable                    Home equipment Eval                                                                        Splinting  [   ] Only      GOALS:   ADL   [    ]   Independent                    Transfers  [   x ] Independent                          Ambulation  [  x  ] Independent     [ x    ] With device                            [    ]  CG                                                         [    ]  CG                                                                  [    ] CG                            [    ] Min A                                                   [    ] Min A                                                              [    ] Min  A          DISCHARGE PLAN:   [    ]  Good candidate for Intensive Rehabilitation/Hospital based                                             Will tolerate 3hrs Intensive Rehab Daily                                       [ x    ]  Short Term Rehab in Skilled Nursing Facility  /  [facility]                                        [     ]  Home with Outpatient or  services                                         [     ]  Possible Candidate for Intensive Hospital based Rehab

## 2022-10-14 NOTE — H&P ADULT - HISTORY OF PRESENT ILLNESS
76 yo M with PMHx of HLD, HTN, Schizophrenia presents from [facility] for lower extremity weakness. Pt reports that for the past several years, he has been feeling weak and deconditioned. States that typically, he is sedentary, does not exercise or ambulate much. Intermittently, his lower extremities "buckle", causing him to fall. Pt denies any lightheadedness, syncope, chest pain, SOB, palpitations, pain. Similar admission 1 year ago for which he underwent work up, Neuro eval and no significant pathologies were identified as cause, likely attributable to deconditioning.  In the ED, VSS. Labs unremarkable. CT Head normal.

## 2022-10-14 NOTE — PHYSICAL THERAPY INITIAL EVALUATION ADULT - SPECIFY REASON(S)
PT evaluation attempted. Patient refusing to participate. States "i'm going to fall". Despite max encouragement and assurance of patient's safety, patient continues to refuse.

## 2022-10-14 NOTE — PATIENT PROFILE ADULT - DO YOU FEEL THREATENED BY OTHERS?
Pt called in and said he is at pharmacy right now and needs his refill on his medication pt said they faxed us a request but i could not find one for pt. Spoke to pharmacist who is re faxing request and verified fax #. Explained to pt that once i receive it i will give it to dr maddox to sign.   no

## 2022-10-14 NOTE — PATIENT PROFILE ADULT - NSPROGENOTHERPROVIDER_GEN_A_NUR
[FreeTextEntry1] : Patient in for follow up HTN works rotating Shifts \par Needs labs and meds   cardio refferall none

## 2022-10-14 NOTE — DISCHARGE NOTE NURSING/CASE MANAGEMENT/SOCIAL WORK - NSDCPEFALRISK_GEN_ALL_CORE
For information on Fall & Injury Prevention, visit: [email or web address] OR  [email or web address] OR  https://www.cdc.gov/steadi/patient.html

## 2022-10-14 NOTE — DISCHARGE NOTE NURSING/CASE MANAGEMENT/SOCIAL WORK - PATIENT PORTAL LINK FT
You can access the FollowMyHealth Patient Portal offered by [facility] by registering at the following website: [email or web address]. By joining [employer]’s FollowMyHealth portal, you will also be able to view your health information using other applications (apps) compatible with our system.

## 2022-10-14 NOTE — H&P ADULT - NSHPPHYSICALEXAM_GEN_ALL_CORE
VITALS:   T(C): 38.6 (10-14-22 @ 04:45), Max: 38.6 (10-14-22 @ 04:45)  HR: 84 (10-14-22 @ 04:45) (84 - 98)  BP: 128/62 (10-14-22 @ 04:45) (128/62 - 138/83)  RR: 18 (10-14-22 @ 04:45) (18 - 18)  SpO2: 95% (10-14-22 @ 02:20) (94% - 95%)    GENERAL: NAD, lying in bed comfortably  HEAD:  Atraumatic, normocephalic  EYES: EOMI, PERRLA, conjunctiva and sclera clear  ENT: Moist mucous membranes  NECK: Supple, no JVD  HEART: Regular rate and rhythm, no murmurs, rubs, or gallops  LUNGS: Unlabored respirations.  Clear to auscultation bilaterally, no crackles, wheezing, or rhonchi  ABDOMEN: Soft, nontender, nondistended, +BS  EXTREMITIES: 2+ peripheral pulses bilaterally. No clubbing, cyanosis, or edema. Tremor of RUE, chronic  NERVOUS SYSTEM:  A&Ox3, poor historia  SKIN: No rashes or lesions VITALS:   T(C): 38.6 (10-14-22 @ 04:45), Max: 38.6 (10-14-22 @ 04:45)  HR: 84 (10-14-22 @ 04:45) (84 - 98)  BP: 128/62 (10-14-22 @ 04:45) (128/62 - 138/83)  RR: 18 (10-14-22 @ 04:45) (18 - 18)  SpO2: 95% (10-14-22 @ 02:20) (94% - 95%)    GENERAL: NAD, lying in bed comfortably, Poor hygiene  HEAD:  Atraumatic, normocephalic  EYES: EOMI, PERRLA, conjunctiva and sclera clear  HEART: Regular rate and rhythm, no murmurs, rubs, or gallops  LUNGS: Unlabored respirations.  Clear to auscultation bilaterally, no crackles, wheezing, or rhonchi  ABDOMEN: Soft, nontender, nondistended, +BS  EXTREMITIES: No clubbing, cyanosis, or edema. Tremor of RUE, chronic, 5/5 motor strength of bilateral UE, 4/5 motor strength of bilateral LE with mild tremor  NERVOUS SYSTEM:  A&Ox3, poor historian

## 2022-10-20 DIAGNOSIS — E78.5 HYPERLIPIDEMIA, UNSPECIFIED: ICD-10-CM

## 2022-10-20 DIAGNOSIS — R53.1 WEAKNESS: ICD-10-CM

## 2022-10-20 DIAGNOSIS — N18.9 CHRONIC KIDNEY DISEASE, UNSPECIFIED: ICD-10-CM

## 2022-10-20 DIAGNOSIS — Z20.822 CONTACT WITH AND (SUSPECTED) EXPOSURE TO COVID-19: ICD-10-CM

## 2022-10-20 DIAGNOSIS — I12.9 HYPERTENSIVE CHRONIC KIDNEY DISEASE WITH STAGE 1 THROUGH STAGE 4 CHRONIC KIDNEY DISEASE, OR UNSPECIFIED CHRONIC KIDNEY DISEASE: ICD-10-CM
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
## 2022-10-20 DIAGNOSIS — H40.9 UNSPECIFIED GLAUCOMA: ICD-10-CM

## 2022-10-20 DIAGNOSIS — F20.9 SCHIZOPHRENIA, UNSPECIFIED: ICD-10-CM

## 2022-10-20 DIAGNOSIS — R26.89 OTHER ABNORMALITIES OF GAIT AND MOBILITY: ICD-10-CM

## 2022-10-20 DIAGNOSIS — Z72.3 LACK OF PHYSICAL EXERCISE: ICD-10-CM
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
## 2022-12-18 NOTE — PHYSICAL THERAPY INITIAL EVALUATION ADULT - PHYSICAL ASSIST/NONPHYSICAL ASSIST, REHAB EVAL
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 The transplant failed. He also has hypertension and hyperlipidemia. He was admitted to [facility] in March 2013 with acute renal failure. At that time right-sided catheterization showed pulmonary hypertension with a PA pressure of 64/30. \par \par He presents for routine follow-up.  He was last seen by Dr. Fernando in 2020.  He states there is a long gap between office visits due to COVID.\par He had an echocardiogram performed last month to follow-up pulmonary pressures as requested by his pulmonologist.  He would like to review those results during today's office visit.\par He has undergone successful ballooning of AV fistula on 11/22/22.  \par states his blood pressure has been dropping during dialysis despite discontinuing diltiazem.  He has since been prescribed midodrine 5mg BID daily. \par  Yesterday they removed 1.1, but he has still not reached his dry weight but is closer in comparison to previous visit.\par \par At home he is noting his blood pressures to range from 80s-100s systolic. \par  He denies associated dizzy spells, syncope or near syncope but he is feeling fatigued.  He is SOB if trying to ambulate a distance, this he notes is his baseline.  He is still using 2L NC throughout the day, daily.\par He is on oral therapy for pulmonary hypertension.\par \par  supervision

## 2023-02-02 NOTE — PHYSICAL THERAPY INITIAL EVALUATION ADULT - TINETTI BALANCE TEST, REHAB EVAL
It doesn't appear she has read the [employer] message  Please review with patient  Please let me know if questions  Thank you   19/29: moderate fall risk

## 2023-03-10 NOTE — DISCHARGE NOTE NURSING/CASE MANAGEMENT/SOCIAL WORK - PATIENT PORTAL LINK FT
What Type Of Note Output Would You Prefer (Optional)?: Standard Output
How Severe Is Your Acne?: moderate
Is This A New Presentation, Or A Follow-Up?: Acne
You can access the FollowMyHealth Patient Portal offered by [facility] by registering at the following website: [email or web address]. By joining [employer]’s FollowMyHealth portal, you will also be able to view your health information using other applications (apps) compatible with our system.

## 2023-06-20 NOTE — PATIENT PROFILE ADULT - NSPRESCRALCFREQ_GEN_A_NUR
You are due for the shingles vaccine (shingrix), you may want to check insurance coverage of this vaccine as it is not always covered. If covered please ask insurance is it covered in the doctors office or at the pharmacy.  Medicare and Medicaid is usually only covered at the pharmacy. If not covered you may want to check at the health department and your pharmacy for cost, also you can check [email or web address] for a coupon.    Never

## 2023-09-07 NOTE — BEHAVIORAL HEALTH ASSESSMENT NOTE - BODY HABITUS
-- DO NOT REPLY / DO NOT REPLY ALL --  -- Message is from Engagement Center Operations (ECO) --    General Patient Message: The patient is returning the nurse's call regarding test results. Please contact to discuss after 12 noon.      Caller Information       Type Contact Phone/Fax    08/31/2023 12:28 PM CDT Phone (Outgoing) Delilah Card (Self) [phone] (M)    Left Message     09/01/2023 04:10 PM CDT Phone (Outgoing) Delilah Card (Self) [phone] (M)        Alternative phone number:[phone]     Can a detailed message be left? Yes    Message Turnaround: [place]:    Refer to site's KB page for routing instructions    Please give this turnaround time to the caller:   \"You can expect to receive a response 2-3 business days after your provider's clinical team reviews the message\"               Average build

## 2024-09-26 NOTE — CONSULT NOTE ADULT - SUBJECTIVE AND OBJECTIVE BOX
Well controlled   Lipids in goal  Recheck fasting labs  Low fat, low cholesterol diet, weight loss and 30 minutes of exercise 5 days a week    CC: Weakness and fall      HPI:  77yo M with pmhx of HLD, HTN, schizophrenia presents from [facility] for b/l leg weakness. Patient almost fell when attempting to go to the bathroom. Said his legs gave out on him and denies any dizziness/syncope. Patient is a poor historian. Patient also states he has a tremor for about 15 years for which no doctor has given him treatment for. Leg weakness is also accompanied by pain which he cannot point to a specific part of his legs.     ED course:   vitals: /77, HR 76, afebrile, O2 99% RA   labs: Cr 1.6  imaging:   - CT head: wnl  (25 Oct 2021 20:11)    Home Medications:  benztropine 2 mg oral tablet: 1 tab(s) orally 2 times a day (25 Oct 2021 23:48)  busPIRone 15 mg oral tablet: 1 tab(s) orally 2 times a day (25 Oct 2021 23:48)  enalapril 2.5 mg oral tablet: 1 tab(s) orally once a day (25 Oct 2021 23:48)  haloperidol 10 mg oral tablet: 1 tab(s) orally once a day (at bedtime) (25 Oct 2021 23:48)  haloperidol 5 mg oral tablet: 1 tab(s) orally in am and 1pm  (25 Oct 2021 23:48)  latanoprost 0.005% ophthalmic solution: 1 drop(s) to each affected eye once a day (in the evening) (25 Oct 2021 23:48)  simvastatin 20 mg oral tablet: 1 tab(s) orally once a day (at bedtime) (25 Oct 2021 23:48)      Social History  + smoker  Denies alcohol or drug use      Neuro Exam:  Orientation: oriented to person, place and time. Speech is dysarthric.  Cranial Nerves: Intact except for dysarthria   Motor: muscle tone was normal in all four extremities, muscle strength was normal in all four extremities and normal bulk in all four extremities.              Resting tremors noted bilaterally             No extremity rigidity noted  Sensory exam: intact to pp temp vibration and proprioception.   Coordination:. no dysmetria or limb ataxia  Deep tendon reflexes: 2+/4 UE                                   Brisk patellar reflex  Gait: deferred     NIHSS:     Allergies    No Known Allergies    Intolerances      MEDICATIONS  (STANDING):  amLODIPine   Tablet 5 milliGRAM(s) Oral daily  benztropine 2 milliGRAM(s) Oral two times a day  busPIRone 15 milliGRAM(s) Oral two times a day  chlorhexidine 4% Liquid 1 Application(s) Topical <User Schedule>  enoxaparin Injectable 40 milliGRAM(s) SubCutaneous daily  haloperidol     Tablet 5 milliGRAM(s) Oral <User Schedule>  haloperidol     Tablet 10 milliGRAM(s) Oral at bedtime  latanoprost 0.005% Ophthalmic Solution 1 Drop(s) Both EYES at bedtime  simvastatin 20 milliGRAM(s) Oral at bedtime    MEDICATIONS  (PRN):  acetaminophen     Tablet .. 650 milliGRAM(s) Oral every 6 hours PRN Temp greater or equal to 38C (100.4F), Mild Pain (1 - 3)      LABS:                        13.7   5.55  )-----------( 97       ( 25 Oct 2021 17:10 )             38.3     10-25    140  |  106  |  18  ----------------------------<  107<H>  4.4   |  22  |  1.6<H>    Ca    9.2      25 Oct 2021 17:10  Mg     2.0     10-25    TPro  7.0  /  Alb  4.1  /  TBili  0.3  /  DBili  x   /  AST  16  /  ALT  18  /  AlkPhos  87  10-25            Neuro Imaging:  < from: CT Head No Cont (10.25.21 @ 17:10) >    Findings:    The ventricular system, basal cisterns and cortical sulcal pattern are within normal limits for the patient's stated age. Scattered areas of periventricular hypodensity, likely reflecting chronic microvascular ischemic disease.    There is no acute intracranial hemorrhage, mass-effect or midline shift.    There is no extra-axial collection.    The visualized paranasal sinuses and mastoid complexes are grossly unremarkable.  There is no displaced skull fracture.      Impression:    1. No evidence of acute intracranial hemorrhage or mass effect.    --- End of Report ---      FRANCE BAPTISTE MD; Attending Radiologist  This document has been electronically signed. Oct 25 2021  6:06PM    < end of copied text >        EEG:     Echo:   Carotid Doppler: N/A  Telemetry:

## 2025-01-06 NOTE — ED PROVIDER NOTE - NS_BEDUNITTYPES_ED_ALL_ED
Chief Complaint   Patient presents with    Shoulder Pain     left       SUBJECTIVE:  81-year-old male patient with past medical history of hypertension and as below presents to the urgent care today with complaints of acute left shoulder pain.  Onset of symptoms: 1 week ago.  The patient complains he developed shoulder pain along the lateral aspect of his left shoulder, deltoid area.  Pain is described as shooting in nature, shoots down his left arm.  Patient complains the pain is exacerbated with reaching backwards.  At rest, pain is described as 2 out of 10 in intensity, which increases to 10 out of 10 in intensity with movement.  He does complain of numbness and tingling in his bilateral hands when he is sleeping however this has been happening for quite a while.  Denies any posterior neck pain.  He denies any loss of strength in his left arm.  He denies any recent lifting heavy objects or recent injury to his left shoulder or arm.  Patient states he otherwise feels well, denies any chest pain or shortness of breath.  Denies any recent diet changes.  No recent fevers.  No history of gout.  The patient is right-hand dominant.  Over-the-counter: Tylenol with no relief.  Social History     Socioeconomic History    Marital status: /Civil Union     Spouse name: Alana    Number of children: 1    Years of education: Not on file    Highest education level: Not on file   Occupational History    Occupation: retired, machine setup   Tobacco Use    Smoking status: Former     Current packs/day: 0.00     Average packs/day: 0.5 packs/day for 16.0 years (8.0 ttl pk-yrs)     Types: Cigarettes     Start date:      Quit date: 2016     Years since quittin.0     Passive exposure: Past    Smokeless tobacco: Never   Vaping Use    Vaping status: never used   Substance and Sexual Activity    Alcohol use: Not Currently     Alcohol/week: 3.0 standard drinks of alcohol     Types: 3 Cans of beer per week     Comment: seldom     Drug use: No    Sexual activity: Yes     Partners: Female   Other Topics Concern     Service Yes     Comment: [employer]    Blood Transfusions No    Caffeine Concern No    Occupational Exposure No    Hobby Hazards No    Sleep Concern No    Stress Concern No    Weight Concern No    Special Diet No    Back Care No    Exercise Yes     Comment: Bowling    Bike Helmet Not Asked     Comment: do not ride    Seat Belt Yes    Self-Exams Not Asked   Social History Narrative    Updated 5/24/2020 by Dr. Hidalgo. The patient is . 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 The patient quit smoking in 2016 after a 0.5 pack per day history for 16 years. The patient drinks alcohol socially. The patient denies any illicit drug use.             Hobbies: bowling and a Bud Light     Social Determinants of Health     Financial Resource Strain: Not on file   Food Insecurity: Low Risk  (8/28/2024)    Food Insecurity     Worried about Food: Never true     Food is Gone: Never true   Transportation Needs: Not At Risk (8/28/2024)    Transportation Needs     Lack of Reliable Transportation: No   Physical Activity: Low Risk  (8/28/2024)    Exercise Vital Sign     Days of Exercise per Week: 7 days     Minutes of Exercise per Session: 30 min   Stress: Low Risk  (8/28/2024)    Stress     How Stressed: Not at all   Social Connections: Medium Risk (8/28/2024)    Social Connections     Social Connectivity: 1 or 2 times a week   Interpersonal Safety: Not on file (1/4/2022)       Current Outpatient Medications   Medication Sig Dispense Refill    losartan (COZAAR) 100 MG tablet TAKE 1 TABLET EVERY DAY 90 tablet 3    simvastatin (ZOCOR) 40 MG tablet TAKE 1 TABLET EVERY NIGHT 90 tablet 3    Multiple Vitamins-Minerals (PRESERVISION AREDS 2 PO) Take 2 capsules by mouth daily.      Glucosamine-Chondroit-Vit C-Mn (GLUCOSAMINE 1500 COMPLEX PO) Take 1 tablet by mouth 2 times daily.      Omega-3 Fatty Acids (FISH OIL) 1200 MG  capsule Take 1,200 mg by mouth 2 times daily.       No current facility-administered medications for this visit.       ALLERGIES:  No Known Allergies    Past Medical History:   Diagnosis Date    Arthritis     Hypercholesteremia     Hypertension     Personal history of traumatic fracture     SBO (small bowel obstruction)  (CMD) 05/24/2020    resolved with NG and bowel rest    Skin mole 08/29/2022    27 mm x 16 mm - wife thinks that it has not changed in years    Spinal stenosis 01/04/2022    laminectomy L5-S1 (doing great 2/21/2022)    Uveitis        REVIEW OF SYSTEMS:    See history of present illness.     OBJECTIVE:  Visit Vitals  BP (!) 140/78 (BP Location: RUE - Right upper extremity, Patient Position: Sitting, Cuff Size: Large Adult) Comment (Cuff Size): manual   Pulse 85   Temp 97.7 °F (36.5 °C) (Oral)   Resp 16   SpO2 97%       PHYSICAL ASSESSMENT:    General:  No acute distress, nontoxic appearing male   Pulmonary:  Lung sounds clear to auscultation all fields bilaterally posteriorly, no wheezing/crackles/rales, no respiratory distress, no use of accessory muscles  Cardiovascular:  RRR, S1/S2 present, no murmur  Musculoskeletal: No obvious deformity noted throughout the left shoulder both anteriorly and posteriorly.  No bony tenderness to palpation is noted throughout the cervical spine posteriorly, anterior left clavicle, left anterior, posterior shoulder.  Muscle tenderness to palpation is noted along the left superior deltoid area which is reproducible.  Patient does have full range of motion left arm in extension to 90 degrees on both a frontal and coronal plane.  Pain is reproducible along the left deltoid with extension of the left arm posteriorly.  The patient has full range of motion left elbow.  Equal hand grasp bilaterally.  Skin:  Warm and dry, no rashes/lesions/ecchymoses/jaundice  Neuro:  CN II-XII grossly intact, no gross motor or sensory deficits  Psych:  Normal mood and affect. Alert and  cooperative with exam.      Assessment     Left upper arm pain  (primary encounter diagnosis)  Plan: CBC with Automated Differential, Comprehensive         Metabolic Panel, Uric Acid, TROPONIN I, HIGH         SENSITIVITY, Electrocardiogram 12-Lead, XR         SHOULDER 3 VIEWS LEFT, traMADol (ULTRAM) 50 MG         tablet, SERVICE TO PHYSICAL THERAPY, SERVICE TO        ORTHOPEDICS    Acute pain of left shoulder      Plan   Patient does have hypertension at baseline.  Recheck blood pressure is stable.  Patient as a precaution, cardiac clearance was performed today due to left arm pain with history of hypertension prior to any further physical   Therapy/orthopedic workup.  ECG performed in clinic today normal sinus rhythm with right bundle branch block, ventricular rate 80 bpm, no acute ST change. In comparison to ECG performed in December 2021, no significant change was found.  CBC WNL  CMP kidney function is stable.  Stat troponin normal/negative  Uric acid normal  Left shoulder x-rays impression reviewed in clinic today; no acute findings, degenerative changes noted.  As needed topical Voltaren gel prescribed.  As needed tramadol for moderate pain; patient instructed not to drive nor operate heavy machinery while taking tramadol, as narcotic containing medications may cause drowsiness.  PDMP reviewed, no concerns.  Service to PT for follow-up.  Patient does have a sling to use at home for assistance with elevation of the affected shoulder.  Patient is agreeable to plan.  Service to Ortho placed.       Patient seen with standard precautions used by writer, including gloves and face mask-N95.      Araceli Toribio DNP, APNP, FNP-BC       MED/SURG

## 2025-04-02 NOTE — ED ADULT TRIAGE NOTE - PATIENT ON (OXYGEN DELIVERY METHOD)
"[facility] HEMATOLOGY & ONCOLOGY  [address]  [facility] [address] [phone]    Patient Name: Trae Palma  Encounter Date: 04/08/2025  YOB: 1940  Patient Number: [id number]      REASON FOR FOLLOW-UP:  Trae \"Daniel\" RANDY Palma is a pleasant 84 y.o.  male seen on followup for metastatic stage prostate carcinoma.  He is seen C68D9 of palliative abiraterone with prednisone.  Patient is also on therapy with subcutaneous denosumab for bone metastasis secondary to prostate cancer.  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  He had resumed denosumab 7/30/2024 through present. Patient is seen alone.  History is obtained from patient.  Patient is a reliable historian.             Oncology/Hematology History Overview Note   DIAGNOSTIC ABNORMALITIES:  PSA was 2.5 on 08/21/2013.  PSA was 4.3 on 06/03/2014.  PSA was 4.1 on 08/19/2014.  PSA was 3.8 on 07/08/2015.  PSA was 7.4 on 09/09/2016.  PSA was 5.9 on 09/21/2016.  He was diagnosed with prostate cancer with perineural invasion, Orion score 4 + 3 =7 on 12/28/2016 by Dr. Potter.  Pathology report showed adenocarcinoma [place] 3+3 equal 6 from the right prostate base.  Adenocarcinoma right prostate mid, Orion score 3+3.  Adenocarcinoma prostate right apex Orion score 4+3.  Positive for perineural invasion.  Left prostate biopsy mid positive for adenocarcinoma Orion score 4+ 3. Left prostate biopsy apex positive for adenocarcinoma Orion score 4+3 suspicious for perineural invasion.  Note from BIJU Serna, [place] 06/13/2017.  Patient post radical retropubic prostatectomy and pathology report showed pT3a, pN0 disease.  0 out of 15 lymph nodes positive.  Preoperative PSA was 5.9.  Patient will be followed along by Dr. Carlos Cortez.  PSA was 2.945 on 10/29/2018  PSA was 84.77 on 11/14/2019.  PSA was 131 on 11/19/2019.     The " patient was seen by RUSSELL Nicholas 12/05/2019.  Patient to start acic spine x-ray 12/05/2019 showed no acubicalutamide 12/05/2019.  Thorte compression fracture in the thoracic spine.  Multilevel degenerative changes.  The patient was seen by RUSSELL Aleman 12/18/2019.  Patient was started on Eligard injection.  He is on bicalutamide for the last 2 weeks.  PSA was 130.725.  Bone scan showed metastatic disease in the hip, spine and left femur.  CT abdomen pelvis 12/19/2019 showed interval appearance of widespread osteoblastic metastases.  Interval increase in size of the distal abdominal aortic aneurysm now measuring 3.5 cm.  Multiple 9 mm and less left periaortic and pericaval lymph nodes noted.  Bone scan 12/18/2019 showed multiple foci of radiotracer uptake in the spine, ribs, pelvis, proximal left humerus, and proximal left femur consistent with diffuse osteoblastic metastatic disease.  Left femur x-ray 12/20/2019 showed irregularity and sclerosis of the left inferior pubic ramus and faint sclerotic lesion at the base of the left femoral neck corresponding to the lesion seen on comparison CT and bone scan representing metastatic disease.  X-ray report 02/04/2020. 2 cm sclerotic lesion identified in the proximal left femoral neck  suspicious for osteoblastic metastases. Question irregular sclerotic changes in the left ischium, correlate  with nuclear medicine bone scan findings  Right hip x-ray report 10/26/2020.  Sclerotic lesions in the left femur and in the pelvis may reflect metastatic disease.  There appears to be a sclerotic lesion in the region of the right acetabulum possibly resulting in pain.      PREVIOUS INTERVENTIONS:  He had undergone radical retropubic prostatectomy at [facility] in 05/04/2017.    Post radiation, right hip by Dr. Phan 11/23/2020.   Abiraterone and prednisone 02/18/2020 through present.  Xgeva 120 mg 04/08/2020 through 7/26/2024.  Resumed 7/30/2024 through present.       Prostate cancer metastatic to bone    Initial Diagnosis    Prostate cancer metastatic to bone (CMS/HCC)     8/21/2013 Procedure    PSA 2.585     6/3/2014 Procedure    PSA 4.324     8/19/2014 Procedure    PSA 4.133     7/8/2015 Procedure    PSA 3.860     9/9/2016 Procedure    PSA 7.486     9/21/2016 Procedure    PSA 5.950     12/28/2016 Biopsy    Prostate Biopsy:  Right base - adenocarcinoma, Orion 3+3=6  Right mid - adenocarcinoma, [place] 3+3=6  Right apex - adenocarcinoma, Orion 4+3=7, perineural invasion present  Left base - adenocarcinoma, Orion 3+4=7  Left mid - adenocarcinoma, [place] 4+3=7  Left apex - adenocarcinoma, [place] 4+3=7, suspicious for perineural invasion     5/4/2017 Surgery    Diagnosis  1) PROSTATE GLAND, ROBOTIC-ASSISTED LAPAROSCOPIC PROSTATECTOMY: PROSTATIC ADENOCARCINOMA; SEE SYNOPTIC REPORT.    2) LYMPH NODE, BILATERAL PELVIC, EXCISION: FIFTEEN LYMPH NODES, NEGATIVE FOR MALIGNANCY.      **Electronically signed out by VIVI KENNEDY, SEVERINO WATTS**on 5/8/2017  GAG/gag  Case reviewed by Attending Pathologist      Synoptic Diagnosis  1)  PROSTATE:          Specimen Site:                        Prostate  Procedure:                            Radical prostatectomy  Prostate Weight:                      Weight: 54.2 g                                        With seminal vesicle(s)  Lymph Node Sampling:                  Pelvic lymph node dissection  Histologic Type:                      Acinar adenocarcinoma with focal ductal features  Histological Grade:                   Primary Orion Pattern: Grade 4                                        Secondary [place] Pattern: Grade 3                                        Tertiary Pattern not applicable                                        Total [place] Score: 7  Tumor Quantitation:                   Tumor size (dominant nodule) greatest dimension: 14 mm                                              Site:  Bilateral posterior apex to  mid  Extraprostatic Extension:             Present, nonfocal (established, extensive)                                        Present nonfocal site(s): Left posterior mid  Seminal Vesicle Invasion:             Not identified  Margins:                              Margins uninvolved by invasive carcinoma  Treatment Effect on Carcinoma:        No prior treatment  Lymph-Vascular Invasion:              Present  Primary Tumor (pT):                   pT3a:  Extraprostatic extension or microscopic invasion of bladder neck  Regional Lymph Nodes (pN):            pN0: No regional lymph node metastasis                                          Number of regional lymph nodes examined: 15                                          Number of regional lymph nodes involved - none  Distant Metastasis (pM):              Not applicable  Additional Pathologic Findings:       High-grade prostatic intraepithelial neoplasia (PIN)                                        Nodular prostatic hyperplasia  Comment:                              Grade Group: 3                                        Percent pattern 4: 80  --------------------------------------------------------       6/13/2017 Procedure    PSA <0.10     9/13/2017 Procedure    PSA 0.068     10/29/2018 Procedure    PSA 2.945     11/14/2019 Procedure    PSA 84.770     11/19/2019 Procedure    .725     12/18/2019 Imaging    Bone Scan:  Multiple foci of radiotracer uptake in the spine, ribs, pelvis, proximal left humerus, and proximal left femur, consistent with diffuse osteoblastic metastatic disease.  Recommend radiograph of the left femur/hip to screen for pathological fracture.     12/19/2019 Imaging    CT Abd/Pelvis:  Interval appearance of widespread osteoblastic metastases.  Interval increase in size of a distal abdominal aortic aneurysm now measuring 3.5cm.    Multiple 9mm and less periaortic and pericaval lymph nodes noted.     12/20/2019 Imaging    Xray Left  Femur/Hip:  Irregularity and sclerosis of the left inferior pubic ramus and faint sclerotic lesion at the base of the left femoral neck corresponding to lesions seen on comparison CT and bone scan representing metastatic disease.     2/4/2020 Procedure    PSA 0.968     2/4/2020 Imaging    Xray Left Femur:  IMPRESSION:  1. 2 cm sclerotic lesion identified in the proximal left femoral neck  suspicious for osteoblastic metastases.  2. Question irregular sclerotic changes in the left ischium, correlate  with nuclear medicine bone scan findings.     2/18/2020 -  Chemotherapy    Zytiga/Prednisone     2/28/2020 Procedure    PSA 0.295     3/1/2020 Cancer Staged    Staging form: Prostate, AJCC 8th Edition  - Clinical stage from 3/1/2020: Stage IVB (rcT0, cN0, cM1b, Grade Group: 3) - Signed by Isai Ho MD on 3/1/2020     3/27/2020 Procedure    PSA 0.094     4/8/2020 -  Chemotherapy    OP SUPPORTIVE Denosumab (Xgeva) Q28D     4/24/2020 Procedure    PSA <0.014     5/27/2020 Procedure    PSA <0.014     7/1/2020 Procedure    PSA <0.014     7/29/2020 Procedure    PSA <0.014     8/24/2020 Procedure    PSA <0.014     9/14/2020 Procedure    PSA <0.014     10/21/2020 Procedure    PSA <0.014     10/26/2020 Imaging    Xray Right Hip/Pelvis:     IMPRESSION:  Sclerotic lesions in the left femur and in the pelvis may  reflect metastatic disease. There appears to be a sclerotic lesion in  the region of the right acetabulum, possibly resulting in pain.  Otherwise, no acute osseous abnormality is appreciated.     8/28/2022 -  Chemotherapy    OP PROSTATE Abiraterone / PredniSONE         PAST MEDICAL HISTORY:  ALLERGIES:  No Known Allergies  CURRENT MEDICATIONS:  Outpatient Encounter Medications as of 4/8/2025   Medication Sig Dispense Refill    abiraterone acetate (Zytiga) 250 MG tablet Take 4 tablets by mouth Daily. Take the dose once daily. May be administered without regard to food. Swallow tablets whole with water. Do not crush or  chew 120 tablet 2    aspirin 81 MG EC tablet Take 1 tablet by mouth Daily. Obtain OTC      atorvastatin (LIPITOR) 40 MG tablet Take 1 tablet by mouth Daily.      cholecalciferol (VITAMIN D3) 25 MCG (1000 UT) tablet Take 1 tablet by mouth Daily.      clopidogrel (PLAVIX) 75 MG tablet Take 1 tablet by mouth Daily. 90 tablet 3    isosorbide mononitrate (IMDUR) 30 MG 24 hr tablet Take 1 tablet by mouth Daily for 360 days. 90 tablet 3    Morphine (MS CONTIN) 15 MG 12 hr tablet Take 1 tablet by mouth 2 (Two) Times a Day.      omeprazole (priLOSEC) 20 MG capsule Take 2 capsules by mouth Daily.      oxyCODONE-acetaminophen (PERCOCET)  MG per tablet Take 1 tablet by mouth 5 (Five) Times a Day As Needed for Moderate Pain or Severe Pain.      predniSONE (DELTASONE) 5 MG tablet Take 1 tablet by mouth 2 (Two) Times a Day. 60 tablet 3    pregabalin (LYRICA) 100 MG capsule Take 1 capsule by mouth 2 (Two) Times a Day.      tiZANidine (ZANAFLEX) 4 MG tablet Take 1 tablet by mouth Every 8 (Eight) Hours As Needed for Muscle Spasms.       No facility-administered encounter medications on file as of 4/8/2025.     ADULT ILLNESSES:  Patient Active Problem List   Diagnosis Code    Prostate cancer metastatic to bone C61, C79.51    Former smoker Z87.891    S/P prostatectomy Z90.79    Encounter for screening for malignant neoplasm of colon Z12.11    YA (acute kidney injury) N17.9    Myocardial infarction with nonobstructive coronary atherosclerosis I21.9     SURGERIES:  Past Surgical History:   Procedure Laterality Date    CARDIAC CATHETERIZATION N/A 6/21/2024    Procedure: Left Heart Cath;  Surgeon: Tom Uribe DO;  Location: [facility] CATH INVASIVE LOCATION;  Service: Cardiovascular;  Laterality: N/A;    COLONOSCOPY      COLONOSCOPY N/A 4/26/2023    Procedure: COLONOSCOPY WITH ANESTHESIA;  Surgeon: Julian Velazquez DO;  Location: [facility] ENDOSCOPY;  Service: Gastroenterology;  Laterality: N/A;  pre screen  post  Jae  "Jennings.     EXPLORATORY LAPAROTOMY      1977    NECK SURGERY      PENILE PROSTHESIS IMPLANT      PROSTATE BIOPSY      dr melgar    SHOULDER SURGERY       HEALTH MAINTENANCE ITEMS:  Health Maintenance Due   Topic Date Due    ZOSTER VACCINE (1 of 2) Never done    RSV Vaccine - Adults (1 - 1-dose 75+ series) Never done    ANNUAL WELLNESS VISIT  Never done    COVID-19 Vaccine (3 - 2024-25 season) 09/01/2024       <no information>  Last Completed Colonoscopy            Upcoming       COLORECTAL CANCER SCREENING (COLONOSCOPY - Every 5 Years) Next due on 4/26/2028 04/26/2023  Surgical Procedure: COLONOSCOPY    04/26/2023  COLONOSCOPY                          Immunization History   Administered Date(s) Administered    COVID-19 (MODERNA) BIVALENT 12+YRS 10/28/2022    COVID-19 (MODERNA) Monovalent Original Booster 11/30/2021    Fluzone High-Dose 65+yrs 09/15/2022, 10/31/2023    Pneumococcal Conjugate 20-Valent (PCV20) 10/28/2022     Last Completed Mammogram    This patient has no relevant Health Maintenance data.           FAMILY HISTORY:  Family History   Problem Relation Age of Onset    No Known Problems Mother     No Known Problems Father     Colon cancer Neg Hx     Colon polyps Neg Hx     Esophageal cancer Neg Hx      SOCIAL HISTORY:  Social History     Socioeconomic History    Marital status:    Tobacco Use    Smoking status: Former     Types: Cigarettes    Smokeless tobacco: Never   Vaping Use    Vaping status: Never Used   Substance and Sexual Activity    Alcohol use: Yes     Comment: weekends beer    Drug use: Never    Sexual activity: Defer       REVIEW OF SYSTEMS:    Review of Systems   Constitutional:  Negative for fatigue, fever and unexpected weight change.        \"I feel fine.\"   HENT:  Negative for congestion and mouth sores.    Eyes:  Negative for discharge and redness.   Respiratory:  Negative for shortness of breath and wheezing.    Cardiovascular:  Negative for chest pain and palpitations. " "  Gastrointestinal:  Negative for abdominal pain, nausea and vomiting.   Endocrine: Negative for cold intolerance and heat intolerance.   Genitourinary:  Negative for dysuria and hematuria.   Musculoskeletal:         \"Right shoulder pain. \"[facility]   Skin:  Negative for pallor.   Allergic/Immunologic: Negative for food allergies.   Neurological:  Negative for dizziness, speech difficulty and weakness.   Hematological:  Negative for adenopathy. Does not bruise/bleed easily.   Psychiatric/Behavioral:  Negative for agitation and confusion. The patient is not nervous/anxious.        VITAL SIGNS: /62   Pulse 56   Temp 98 °F (36.7 °C)   Resp 18   Ht 175.3 cm (69\")   Wt 81.4 kg (179 lb 8 oz)   SpO2 98%   BMI 26.51 kg/m²  Gained 3 pounds   Pain Score    04/08/25 0810   PainSc: 8   Comment: back pain   PainLoc: Back       PHYSICAL EXAMINATION:     Physical Exam  Vitals reviewed.   Constitutional:       General: He is not in acute distress.     Comments: He arrived in the exam room with a cane.    HENT:      Head: Normocephalic and atraumatic.   Eyes:      General: No scleral icterus.  Cardiovascular:      Rate and Rhythm: Normal rate.   Pulmonary:      Effort: No respiratory distress.      Breath sounds: No wheezing.   Abdominal:      General: Bowel sounds are normal.      Palpations: Abdomen is soft.   Musculoskeletal:         General: No swelling.      Cervical back: Neck supple.   Skin:     Coloration: Skin is not pale.   Neurological:      Mental Status: He is alert and oriented to person, place, and time.   Psychiatric:         Mood and Affect: Mood normal.         Behavior: Behavior normal.         Thought Content: Thought content normal.         Judgment: Judgment normal.         LABS    Lab Results - Last 18 Months   Lab Units 03/04/25  0745 02/04/25  0729 01/14/25  1112 12/10/24  0722 11/19/24  1306 08/27/24  1253   HEMOGLOBIN g/dL 12.6* 12.5* 13.5 12.4* 12.0* 12.9*   HEMATOCRIT % 38.5 " "38.4 41.6 37.3* 36.5* 38.5   MCV fL 100.3* 100.0* 99.0* 100.0* 100.8* 102.4*   WBC 10*3/mm3 5.73 6.89 6.56 6.73 5.22 6.84   RDW % 15.1 14.6 14.0 14.2 13.8 14.1   MPV fL 9.4 9.6 9.7 9.7 9.2 9.6   PLATELETS 10*3/mm3 160 177 201 182 152 158   IMM GRAN % % 0.5 0.6* 0.5 0.6* 0.2 0.3   NEUTROS ABS 10*3/mm3 3.63 3.84 4.58 4.73 3.58 3.66   LYMPHS ABS 10*3/mm3 1.54 2.08 1.47 1.46 1.26 2.35   MONOS ABS 10*3/mm3 0.47 0.68 0.45 0.49 0.34 0.70   EOS ABS 10*3/mm3 0.04 0.23 0.01 0.00 0.02 0.08   BASOS ABS 10*3/mm3 0.02 0.02 0.02 0.01 0.01 0.03   IMMATURE GRANS (ABS) 10*3/mm3 0.03 0.04 0.03 0.04 0.01 0.02   NRBC /100 WBC 0.0 0.0 0.0 0.0 0.0 0.0       Lab Results - Last 18 Months   Lab Units 03/04/25  0745 02/04/25  0729 01/14/25  1112 12/10/24  0722 11/19/24  1306 10/22/24  1303   GLUCOSE mg/dL 126* 77 124* 122* 169* 106*   SODIUM mmol/L 141 141 138 144 139 141   POTASSIUM mmol/L 4.1 4.0 4.0 4.2 4.3 4.0   CO2 mmol/L 25.0 26.0 21.0* 26.0 25.0 26.0   CHLORIDE mmol/L 106 105 104 108* 104 106   ANION GAP mmol/L 10.0 10.0 13.0 10.0 10.0 9.0   CREATININE mg/dL 0.78 0.87 0.83 0.72* 0.82 0.76   BUN mg/dL 18 19 37* 17 25* 19   BUN / CREAT RATIO  23.1 21.8 44.6* 23.6 30.5* 25.0   CALCIUM mg/dL 9.1 8.7 9.2 8.7 8.9 9.6   ALK PHOS U/L 112 98 83 83 89 78   TOTAL PROTEIN g/dL 6.5 6.4 6.6 6.5 6.3 6.4   ALT (SGPT) U/L 11 9 9 9 10 12   AST (SGOT) U/L 16 16 15 15 14 16   BILIRUBIN mg/dL 1.1 0.5 0.6 0.8 0.8 0.5   ALBUMIN g/dL 3.9 3.9 4.0 4.2 4.0 4.1   GLOBULIN gm/dL 2.6 2.5 2.6 2.3 2.3 2.3       No results for input(s): \"MSPIKE\", \"KAPPALAMB\", \"IGLFLC\", \"URICACID\", \"FREEKAPPAL\", \"CEA\", \"LDH\", \"REFLABREPO\" in the last 57488 hours.    No results for input(s): \"IRON\", \"TIBC\", \"LABIRON\", \"FERRITIN\", \"B1VDHSU\", \"TSH\", \"FOLATE\" in the last 21060 hours.    Invalid input(s): \"VITB12\"    Trae Palma reports a pain score of 8.\"Right shoulder.  Follow up on the 25th.\"  Given his pain assessment as noted, treatment options were discussed and the following " "options were decided upon as a follow-up plan to address the patient's pain: continuation of current treatment plan for pain.        ASSESSMENT:  1.   Prostate cancer.  Baseline AJCC stage IVB (T3a, N0, M0).  [place] score 4+3.  Positive for perineural invasion.  Treatment status:Post radical prostatectomy.  2.   Prostate cancer, metastatic.  AJCC stage IVB (T0, N0, M1b)  Treatment status:  Bicalutamide since 12/05/2019 through 02/04/2020 and E[facility] 12/18/2019 [facility]. Patient is taking palliative abiraterone and prednisone from 2/18/2020 through present.  3.   Performance status of 0.  4.   Bone metastasis due to adenocarcinoma the prostate.  Subcutaneous denosumab 5/4/2022 through 7/26/2023.  Withheld secondary to osteomyelitis of the mandible.  Resumption of subcutaneous denosumab 7/30/2024 through present.  5.   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 \"probably 4 cans right now on weekends.\" Observe.   6.  Small sclerotic focus in the scapula inferior to the glenoid with a small sclerotic focus suggested in the posterior mid right rib. Negative PET.  7.   Back pain from prostate cancer.  He is on treatment with Percocet per pain management.   8.   Right hip pain, 10/23/2020.  Post radiation by Dr. Phan 11/23/2020. Followed by Dr. Gill.   9.   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  Under observation.                 PLAN:    Re:  Tolerance to abiraterone with prednisone. \"It's fine.\"     2.    Re:  Heme status.  Latest WBC 5.7, hemoglobin 12.6, hematocrit 38.5, .3 and platelet 160.   3.    Re:  Pre office CMP.  GFR latest 89 mL/min and glucose 126. Alkaline phosphatase 112.  Magnesium latest 1.9.    4.    Re: Phosphorus latest 3.4.   5.    Re: Pre office PSA. Latest < 0.014 from < 0.014 from < 0.014 from < 0.014 from <0.014 from <0.014 from  <0.014 from < 0.014 from < 0.014 from <0/014 from <0.014.  6.    Re: Proceed with subcutaneous denosumab.   7.   " Continue ongoing management per primary care physician and the other specialists.  8.   Plan of care discussed with patient.  Understanding expressed.  Patient agreed to proceed.  9.  eRx for C68D1 started on 3/31/2025 abiraterone 250 mg, take four tablets daily, number 120, 2 refills.  May be administered without regard to food. Swallow tablets whole with water. Do not crush or chew.  Observe for hot flashes, hypertension, hepatotoxicity, mineralocorticoid excess like hypokalemia, or adrenal sufficiency.            10.  eRx for prednisone 5 mg twice daily, 90, 3 refills if needed. Taken with food or milk.    11.  eRx for prochlorperazine 10 mg p.o. every 4 hours as needed for nausea and vomiting #60 with 2 refills if needed.  12.  Advance Care Planning  ACP discussion was held with the patient during this visit. Patient has an advance directive in EMR which is still valid.   13.  [facility].  14.  [facility].  15.  Schedule denosumab 120 mg subcutaneous every 4 weeks for bone metastasis.  Monitor for osteonecrosis of the jaw.  16.  Return to office in 4 weeks with CBC with differential, magnesium, phosphorus, PSA, and CMP, same day.             I have reviewed the assessment and plan and verified the accuracy of it. No changes to assessment and plan since the information was documented. Isai Ho MD 04/08/25           I spent 30 total minutes, face-to-face, caring for Trae washing[place]. Greater than 50% of this time involved counseling and/or coordination of care as documented within this note.                 [facility]  (KERVIN Gill MD)  (Carlos Cortez MD)  (Abhi Rome MD)  ([facility]).    room air